# Patient Record
Sex: MALE | Race: WHITE | NOT HISPANIC OR LATINO | Employment: FULL TIME | URBAN - METROPOLITAN AREA
[De-identification: names, ages, dates, MRNs, and addresses within clinical notes are randomized per-mention and may not be internally consistent; named-entity substitution may affect disease eponyms.]

---

## 2018-07-17 ENCOUNTER — NURSE TRIAGE (OUTPATIENT)
Dept: ADMINISTRATIVE | Facility: CLINIC | Age: 23
End: 2018-07-17

## 2018-07-18 NOTE — TELEPHONE ENCOUNTER
Reason for Disposition   [1] SEVERE widespread itching (i.e., interferes with sleep, normal activities or school) AND [2] not improved after 24 hours of itching Care Advice   [1] New-onset head lice AND [2] usually respond to OTC meds in your community  (all triage questions negative)    Protocols used: ST ITCHING - WIDESPREAD AND CAUSE UNKNOWN-A-AH, ST LICE - HEAD-A-AH

## 2018-07-26 ENCOUNTER — NURSE TRIAGE (OUTPATIENT)
Dept: ADMINISTRATIVE | Facility: CLINIC | Age: 23
End: 2018-07-26

## 2018-07-27 NOTE — TELEPHONE ENCOUNTER
Reason for Disposition   Scabies treatment, questions about    Protocols used:  SCABIES FOLLOW-UP CALL-A-PELON    Kurt called wanting to know can scabies be in bed linens.  Home care advice given; explained can live on clothing that has been next to skin for 3 days. Information on washing of clothing and linens given below.  Encouraged call back for any additional questions.  Message to Ajay Conley Jr., MD , pcp. Please contact caller directly with any additional care advice.        Scabies  Scabies is an infection caused by mites that burrow into the skin. The mites, called Sarcoptes scabiei, are very tiny. They cause severe itching. Though children are most commonly infected, anyone can get scabies. Scabies mites can pass from person to person through close physical contact. They can also be passed through shared clothing, towels, and bedding. Scabies infection is not usually dangerous, but it is uncomfortable. Because it is so contagious, scabies should be treated immediately to keep the infection from spreading.  Symptoms  Symptoms of scabies appear about 4 weeks to 6 weeks after infection in a child or adult who has never had scabies before. A child or adult who has been infected before will experience symptoms much sooner, in 1 day to 4 days. Signs of scabies infection may include:  Intense itching, especially at night or after a hot bath.  Skin irritations that look like hives, insect bites, pimples, or blisters, especially on warmer areas of the body (such as between the fingers, in the armpits, and in the creases of the wrists, elbows, and knees).  Sores on the body caused by scratching (the sores may become infected).  [place] created by mites traveling under the skin, which look like lines on the skins surface.  Treating Scabies Infection  Scabies infections are usually treated with a prescription lotion that kills the mites. The lotion must be applied to the entire body from the neck down  (including the palms of the hands, soles of the feet, groin, and under the fingernails). The lotion must be left on for 8 hours to 14 hours. In some cases, a second application of lotion is needed a week after the first. Medications work quickly, but most children and adults continue to have an itchy rash for several weeks after treatment. Marks on the skin from scabies usually go away in a week or 2, but sometimes take a few months to clear.  Preventing Spread of the Infection  To prevent reinfection and the spread of scabies to others, follow these instructions:  Treatment of all household members who may have been exposed to scabies may be necessary, whether they show symptoms or not. Talk with your doctor.  Wash the infected persons clothing, towels, bed linens, cloth toys, and other personal items in very hot, soapy water. Dry them thoroughly. Do not share among family members.   Seal items that cant be washed in plastic bags for 2 weeks.  Vacuum floors and furniture. Throw the vacuum bag away afterward.  Notify an infected childs school and caregivers so that other children can be checked and treated.  Keep an infected child home from day care or school until the morning after treatment for scabies.  Warn children not to share items such as clothing and towels with other children.  DO NOT spray your house with chemicals or pesticides. These can be dangerous to your familys health.    When to Call the Doctor  Call your doctor if:  The infected person has a fever, red streaks, pain, or swelling of the skin.  Sores get worse or do not heal.  New rashes appear or itching continues for more than 2 weeks after treatment.       © 6777-5598 [employer]. [address] [place] [address], [place] [address]. All rights reserved. This information is not intended as a substitute for professional medical care. Always follow your healthcare professional's instructions.

## 2019-03-26 PROBLEM — L05.91 PILONIDAL CYST: Status: ACTIVE | Noted: 2019-03-26

## 2021-09-09 ENCOUNTER — TELEPHONE (OUTPATIENT)
Dept: OTOLARYNGOLOGY | Facility: CLINIC | Age: 26
End: 2021-09-09

## 2024-02-24 ENCOUNTER — OFFICE VISIT (OUTPATIENT)
Dept: URGENT CARE | Facility: CLINIC | Age: 29
End: 2024-02-24
Payer: COMMERCIAL

## 2024-02-24 VITALS
OXYGEN SATURATION: 96 % | BODY MASS INDEX: 23.62 KG/M2 | SYSTOLIC BLOOD PRESSURE: 152 MMHG | TEMPERATURE: 98 F | RESPIRATION RATE: 18 BRPM | DIASTOLIC BLOOD PRESSURE: 81 MMHG | WEIGHT: 165 LBS | HEART RATE: 58 BPM | HEIGHT: 70 IN

## 2024-02-24 DIAGNOSIS — J02.9 SORE THROAT: Primary | ICD-10-CM

## 2024-02-24 DIAGNOSIS — J06.9 UPPER RESPIRATORY TRACT INFECTION, UNSPECIFIED TYPE: ICD-10-CM

## 2024-02-24 LAB
CTP QC/QA: YES
MOLECULAR STREP A: NEGATIVE
POC MOLECULAR INFLUENZA A AGN: NEGATIVE
POC MOLECULAR INFLUENZA B AGN: NEGATIVE
SARS-COV-2 AG RESP QL IA.RAPID: NEGATIVE

## 2024-02-24 PROCEDURE — 87811 SARS-COV-2 COVID19 W/OPTIC: CPT | Mod: QW,S$GLB,, | Performed by: NURSE PRACTITIONER

## 2024-02-24 PROCEDURE — 87502 INFLUENZA DNA AMP PROBE: CPT | Mod: QW,S$GLB,, | Performed by: NURSE PRACTITIONER

## 2024-02-24 PROCEDURE — 99204 OFFICE O/P NEW MOD 45 MIN: CPT | Mod: S$GLB,,, | Performed by: NURSE PRACTITIONER

## 2024-02-24 PROCEDURE — 87651 STREP A DNA AMP PROBE: CPT | Mod: QW,S$GLB,, | Performed by: NURSE PRACTITIONER

## 2024-02-24 NOTE — LETTER
February 24, 2024      [facility]  [address], [address]  [place] [address]  Phone: [phone]  Fax: [phone]       Patient: Kurt Devlin   YOB: 1995  Date of Visit: 02/24/2024    To Whom It May Concern:    Hazel Devlin  was at [facility] on 02/24/2024. The patient may return to work/school on 2/26/2024 with no restrictions. If you have any questions or concerns, or if I can be of further assistance, please do not hesitate to contact me.    Sincerely,    Yael Spears, NP

## 2024-02-24 NOTE — PROGRESS NOTES
"Subjective:      Patient ID: Kurt Devlin is a 28 y.o. male.    Vitals:  height is 5' 10" (1.778 m) and weight is 74.8 kg (165 lb). His oral temperature is 98.2 °F (36.8 °C). His blood pressure is 152/81 (abnormal) and his pulse is 58 (abnormal). His respiration is 18 and oxygen saturation is 96%.     Chief Complaint: Sore Throat    Pt started last night with a sore throat and now has nausea. Pt had a negative home COVID test today.    Sore Throat   This is a new problem. The current episode started yesterday. The problem has been gradually worsening. Neither side of throat is experiencing more pain than the other. There has been no fever. The fever has been present for Less than 1 day. The pain is at a severity of 6/10. The pain is moderate. Associated symptoms include congestion, coughing and headaches. Pertinent negatives include no ear pain. Associated symptoms comments: Body aches, nausea. He has tried acetaminophen (Mucinex) for the symptoms. The treatment provided moderate relief.       HENT:  Positive for congestion and sore throat. Negative for ear pain.    Respiratory:  Positive for cough.    Neurological:  Positive for headaches.      Objective:     Physical Exam   Constitutional: He is oriented to person, place, and time. normal  HENT:   Nose: Congestion present.   Mouth/Throat: Posterior oropharyngeal erythema present.   Eyes: Conjunctivae are normal. Pupils are equal, round, and reactive to light. Extraocular movement intact   Cardiovascular: Normal rate.   Pulmonary/Chest: Effort normal.   Abdominal: Normal appearance.   Musculoskeletal: Normal range of motion.         General: Normal range of motion.   Neurological: no focal deficit. He is alert, oriented to person, place, and time and at baseline.   Skin: Skin is warm. Capillary refill takes less than 2 seconds.   Nursing note and vitals reviewed.    Assessment:     1. Sore throat    2. Upper respiratory tract infection, unspecified type  "     Results for orders placed or performed in visit on 02/24/24   POCT Strep A, Molecular   Result Value Ref Range    Molecular Strep A, POC Negative Negative     Acceptable Yes    POCT Influenza A/B MOLECULAR   Result Value Ref Range    POC Molecular Influenza A Ag Negative Negative, Not Reported    POC Molecular Influenza B Ag Negative Negative, Not Reported     Acceptable Yes    SARS Coronavirus 2 Antigen, POCT Manual Read   Result Value Ref Range    SARS Coronavirus 2 Antigen Negative Negative     Acceptable Yes        Plan:       Sore throat  -     POCT Strep A, Molecular  -     POCT Influenza A/B MOLECULAR  -     SARS Coronavirus 2 Antigen, POCT Manual Read    Upper respiratory tract infection, unspecified type

## 2024-04-20 ENCOUNTER — OFFICE VISIT (OUTPATIENT)
Dept: URGENT CARE | Facility: CLINIC | Age: 29
End: 2024-04-20
Payer: COMMERCIAL

## 2024-04-20 VITALS
WEIGHT: 160 LBS | BODY MASS INDEX: 22.9 KG/M2 | SYSTOLIC BLOOD PRESSURE: 135 MMHG | HEIGHT: 70 IN | RESPIRATION RATE: 17 BRPM | TEMPERATURE: 98 F | HEART RATE: 71 BPM | DIASTOLIC BLOOD PRESSURE: 89 MMHG | OXYGEN SATURATION: 98 %

## 2024-04-20 DIAGNOSIS — L73.9 FOLLICULITIS: Primary | ICD-10-CM

## 2024-04-20 PROCEDURE — 99213 OFFICE O/P EST LOW 20 MIN: CPT | Mod: S$GLB,,, | Performed by: PHYSICIAN ASSISTANT

## 2024-04-20 RX ORDER — MUPIROCIN 20 MG/G
OINTMENT TOPICAL 3 TIMES DAILY
Qty: 15 G | Refills: 0 | Status: SHIPPED | OUTPATIENT
Start: 2024-04-20

## 2024-04-20 RX ORDER — MUPIROCIN 20 MG/G
OINTMENT TOPICAL 3 TIMES DAILY
Qty: 15 G | Refills: 0 | Status: SHIPPED | OUTPATIENT
Start: 2024-04-20 | End: 2024-04-20

## 2024-04-20 NOTE — PROGRESS NOTES
"Subjective:      Patient ID: Kurt Devlin is a 28 y.o. male.    Vitals:  height is 5' 10" (1.778 m) and weight is 72.6 kg (160 lb). His oral temperature is 98.1 °F (36.7 °C). His blood pressure is 135/89 and his pulse is 71. His respiration is 17 and oxygen saturation is 98%.     Chief Complaint: Rash    Pt reports x3 days he has had a rash on his penis. Patient reports getting very sweaty while crabbing and noticed some irritation. He has been very sexually active and believes friction made it worse. No itching or significant pain to area, other than touching.      Rash  This is a new problem. The current episode started in the past 7 days. The problem has been gradually improving since onset. The affected locations include the abdomen (genital). The rash is characterized by redness, itchiness and swelling. He was exposed to nothing. Pertinent negatives include no congestion, cough, fatigue, fever, rhinorrhea or shortness of breath. Treatments tried: lotrimin cream. The treatment provided mild relief.       Constitution: Negative for fatigue and fever.   HENT:  Negative for congestion.    Respiratory:  Negative for cough and shortness of breath.    Skin:  Positive for rash.      Objective:     Physical Exam   Constitutional: He is oriented to person, place, and time. He appears well-developed. No distress.   HENT:   Head: Normocephalic and atraumatic.   Ears:   Right Ear: External ear normal.   Left Ear: External ear normal.   Nose: Nose normal. No nasal deformity. No epistaxis.   Mouth/Throat: Oropharynx is clear and moist and mucous membranes are normal.   Eyes: Conjunctivae and lids are normal.   Neck: Trachea normal and phonation normal. Neck supple.   Cardiovascular: Normal rate, regular rhythm and normal heart sounds.   Pulmonary/Chest: Effort normal and breath sounds normal.   Abdominal: Normal appearance and bowel sounds are normal. He exhibits no distension. Soft. There is no abdominal tenderness. "   Genitourinary:    Testes normal.           Comments: Minuscule scab to underside of penis with mild crusting. No vessicles or lesions.       Musculoskeletal: Normal range of motion.         General: Normal range of motion.   Neurological: He is alert and oriented to person, place, and time. He has normal reflexes.   Skin: Skin is warm, dry, intact and not diaphoretic.   Psychiatric: His speech is normal and behavior is normal. Judgment and thought content normal.   Nursing note and vitals reviewed.      Assessment:     1. Folliculitis        Plan:       Folliculitis  -     Discontinue: mupirocin (BACTROBAN) 2 % ointment; Apply topically 3 (three) times daily.  Dispense: 15 g; Refill: 0  -     mupirocin (BACTROBAN) 2 % ointment; Apply topically 3 (three) times daily.  Dispense: 15 g; Refill: 0

## 2024-04-29 ENCOUNTER — OFFICE VISIT (OUTPATIENT)
Dept: URGENT CARE | Facility: CLINIC | Age: 29
End: 2024-04-29
Payer: COMMERCIAL

## 2024-04-29 VITALS
BODY MASS INDEX: 23.62 KG/M2 | WEIGHT: 165 LBS | RESPIRATION RATE: 17 BRPM | HEART RATE: 56 BPM | DIASTOLIC BLOOD PRESSURE: 83 MMHG | OXYGEN SATURATION: 98 % | SYSTOLIC BLOOD PRESSURE: 125 MMHG | HEIGHT: 70 IN | TEMPERATURE: 98 F

## 2024-04-29 DIAGNOSIS — Z20.2 STD EXPOSURE: Primary | ICD-10-CM

## 2024-04-29 PROCEDURE — 99214 OFFICE O/P EST MOD 30 MIN: CPT | Mod: S$GLB,,, | Performed by: FAMILY MEDICINE

## 2024-04-29 NOTE — LETTER
April 29, 2024  Kurt Devlin  [address]  [facility] [address]                [facility]  [address], [address]  [facility] [address]  Phone: [phone]  Fax: [phone] Kurt Devlin was seen and treated in our Urgent Care department on 4/29/2024. He may return to work in 2 - 3 days.      If you have any questions or concerns, please don't hesitate to call.        Sincerely,        Alverto Wiley MD

## 2024-04-29 NOTE — PROGRESS NOTES
"Subjective:      Patient ID: Kurt Devlin is a 28 y.o. male.    Vitals:  height is 5' 10" (1.778 m) and weight is 74.8 kg (165 lb). His oral temperature is 98.2 °F (36.8 °C). His blood pressure is 125/83 and his pulse is 56 (abnormal). His respiration is 17 and oxygen saturation is 98%.     Chief Complaint: Exposure to STD    Pt states that his partner told him that she went to the doctor and her skin was broke out. She was told it was herpes. Pt states that he was here two weeks ago with the same thing but was told it was a hair follicle. Its went away.     Exposure to STD  The patient's pertinent negatives include no genital itching, pelvic pain or testicular pain. This is a new problem. Associated symptoms include a rash. Pertinent negatives include no discolored urine, flank pain, frequency or hesitancy. Nothing aggravates the symptoms. He has tried nothing for the symptoms. The treatment provided no relief. He is sexually active. He inconsistently uses condoms. It is possible that his partner has an STD. There is no history of erectile dysfunction, gonorrhea, herpes simplex or kidney stones.       Constitution: Negative.   HENT: Negative.     Cardiovascular: Negative.    Eyes: Negative.    Respiratory: Negative.     Gastrointestinal: Negative.    Endocrine: negative.   Genitourinary: Negative.  Negative for frequency, flank pain, testicular pain and pelvic pain.   Musculoskeletal: Negative.    Skin:  Positive for rash.   Allergic/Immunologic: Negative.    Neurological: Negative.    Hematologic/Lymphatic: Negative.    Psychiatric/Behavioral: Negative.        Objective:     Physical Exam   Constitutional: He is oriented to person, place, and time. He appears well-developed. No distress.   HENT:   Head: Normocephalic and atraumatic.   Ears:   Right Ear: External ear normal.   Left Ear: External ear normal.   Nose: Nose normal. No nasal deformity. No epistaxis.   Mouth/Throat: Oropharynx is clear and moist " and mucous membranes are normal.   Eyes: Conjunctivae and lids are normal.   Neck: Trachea normal and phonation normal. Neck supple.   Cardiovascular: Normal rate, regular rhythm and normal heart sounds.   Pulmonary/Chest: Effort normal and breath sounds normal.   Abdominal: Normal appearance and bowel sounds are normal. He exhibits no distension. Soft. There is no abdominal tenderness.   Musculoskeletal: Normal range of motion.         General: Normal range of motion.   Neurological: He is alert and oriented to person, place, and time. He has normal reflexes.   Skin: Skin is warm, dry, intact and not diaphoretic.   Psychiatric: His speech is normal and behavior is normal. Judgment and thought content normal.   Nursing note and vitals reviewed.      Assessment:     1. STD exposure        Plan:       STD exposure      Please return here or go to the Emergency Department for any concerns or worsening of condition.  If you were prescribed antibiotics, please take them to completion.  If you were prescribed a narcotic medication, do not drive or operate heavy equipment or machinery while taking these medications.  Please follow up with your primary care doctor or specialist as needed.  Please drink plenty of fluids.  Please get plenty of rest.    Push fluids aggressively to improve symptoms and wash through the infection.  Cranberry juice can help relieve symptoms    We recommend avoiding sexual activity until your culture results come back.  To avoid reinfection, your sexual partner will need to be treated as well prior to resuming sexual activity.    We recommend always using barrier protection (condoms) during sexual activity.    If you  smoke, please stop smoking.    Please follow up with your primary care doctor or specialist as needed.    Ajay Conley Jr., MD  [phone]    You must understand that you have received treatment at an Urgent Care facility only, and that you may be  released before all of your  medical problems are known or treated. Urgent Care facilities are not equipped to  handle life threatening emergencies. It is recommended that you seek care at an Emergency Department for  further evaluation of worsening or concerning symptoms, or possibly life threatening conditions as  discussed.

## 2024-04-29 NOTE — PATIENT INSTRUCTIONS
Please return here or go to the Emergency Department for any concerns or worsening of condition.  If you were prescribed antibiotics, please take them to completion.  If you were prescribed a narcotic medication, do not drive or operate heavy equipment or machinery while taking these medications.  Please follow up with your primary care doctor or specialist as needed.  Please drink plenty of fluids.  Please get plenty of rest.    Push fluids aggressively to improve symptoms and wash through the infection.  Cranberry juice can help relieve symptoms    We recommend avoiding sexual activity until your culture results come back.  To avoid reinfection, your sexual partner will need to be treated as well prior to resuming sexual activity.    We recommend always using barrier protection (condoms) during sexual activity.    If you  smoke, please stop smoking.    Please follow up with your primary care doctor or specialist as needed.    Ajay Conley Jr., MD  [phone]      You must understand that you have received treatment at an Urgent Care facility only, and that you may be  released before all of your medical problems are known or treated. Urgent Care facilities are not equipped to  handle life threatening emergencies. It is recommended that you seek care at an Emergency Department for  further evaluation of worsening or concerning symptoms, or possibly life threatening conditions as  discussed.    Suspected STI, Culture Only  Your symptoms suggest that you may have a sexually transmitted infection (STI). The most common bacteria that cause STIs are chlamydia and gonorrhea. Both are highly contagious. They are passed by sexual contact with an infected partner.  Symptoms begin within 1 to 3 weeks after exposure. There is usually a discharge from the penis or vagina and burning during urination. Many women with one of these infections will have only mild symptoms or no symptoms at all early in the disease.  Culture  tests have been taken. These will show if you have an infection with chlamydia or gonorrhea. These infections can be treated and cured with antibiotic medicine.  Home care  Do not have sex until you know that your test result is negative.  If a culture was done, call for the results. If the culture is positive, contact your healthcare provider, local clinic, or local public health department to be treated, or return to our facility.  You will be prescribed antibiotic medicine. Be sure to take all of the antibiotic as prescribed until it is gone or you are told to stop. Keep taking it even if you feel better.  Both you and your sexual partner or partners need to be treated, even if the partner has no symptoms.  Do not have sex until both you and your partner or partners have finished all antibiotic medicine and you are told that you are no longer contagious.  Learn about safe sex practices and use these in the future. The safest sex is with a partner who has tested negative for STIs and only has sex with you. Condoms can help prevent the spread of gonorrhea and chlamydia, but are not a guarantee.  Follow-up care  Follow up with your healthcare provider or as advised by our staff. Call as directed for the results of your culture. If your culture test is positive and you are treated with an antibiotic, you should have another culture taken 4 to 6 weeks after treatment. This is to be sure the infection has cleared. Follow up with your provider or the public health department for complete STI screening, including HIV testing. For more information about STIs, call the CDC information line at 348-136-3636.  When to seek medical advice  Call your healthcare provider if any of these occur:  Fever of 100.4ºF (38.0ºC) or higher, or as directed  New pain in your lower belly (abdomen) or back or pain that gets worse  Unexpected vaginal bleeding  Weakness, dizziness, or fainting  Repeated vomiting  Inability to urinate because  of pain  Rash or joint pain  Painful open sores on the penis or around the outer vagina  Enlarged painful lumps (lymph nodes) in the groin  Testicle pain or scrotal swelling in men  Date Last Reviewed: 9/25/2015 © 2000-2017 [employer]. [address] [place] [address]. All rights reserved. This information is not intended as a substitute for professional medical care. Always follow your healthcare professional's instructions.

## 2024-07-16 ENCOUNTER — OFFICE VISIT (OUTPATIENT)
Dept: URGENT CARE | Facility: CLINIC | Age: 29
End: 2024-07-16
Payer: COMMERCIAL

## 2024-07-16 VITALS
OXYGEN SATURATION: 97 % | RESPIRATION RATE: 20 BRPM | DIASTOLIC BLOOD PRESSURE: 80 MMHG | HEART RATE: 74 BPM | SYSTOLIC BLOOD PRESSURE: 134 MMHG | BODY MASS INDEX: 24.01 KG/M2 | WEIGHT: 167.69 LBS | HEIGHT: 70 IN | TEMPERATURE: 98 F

## 2024-07-16 DIAGNOSIS — R11.0 NAUSEA: Primary | ICD-10-CM

## 2024-07-16 PROCEDURE — 99213 OFFICE O/P EST LOW 20 MIN: CPT | Mod: S$GLB,,, | Performed by: PHYSICIAN ASSISTANT

## 2024-07-16 RX ORDER — ONDANSETRON 4 MG/1
4 TABLET, ORALLY DISINTEGRATING ORAL EVERY 6 HOURS PRN
Qty: 20 TABLET | Refills: 0 | Status: SHIPPED | OUTPATIENT
Start: 2024-07-16

## 2024-07-16 NOTE — PROGRESS NOTES
"Subjective:      Patient ID: Kurt Devlin is a 28 y.o. male.    Vitals:  height is 5' 10" (1.778 m) and weight is 76.1 kg (167 lb 10.6 oz). His oral temperature is 97.9 °F (36.6 °C). His blood pressure is 134/80 and his pulse is 74. His respiration is 20 and oxygen saturation is 97%.     Chief Complaint: Nausea    Pt states he is coming in for nausea and decreased appetite since this morning. Reports no vomiting or diarrhea. Admits that his nausea is improving but was told he needed a note to return to work    Nausea  This is a new problem. The current episode started yesterday. The problem occurs rarely. The problem has been unchanged. Associated symptoms include chills and nausea. Pertinent negatives include no congestion, coughing, headaches, sore throat or vomiting. The symptoms are aggravated by eating. He has tried nothing for the symptoms.       Constitution: Positive for chills.   HENT:  Negative for congestion and sore throat.    Respiratory:  Negative for cough.    Gastrointestinal:  Positive for nausea. Negative for vomiting.   Neurological:  Negative for headaches.      Objective:     Physical Exam   Constitutional: He is oriented to person, place, and time. He appears well-developed. He is cooperative.  Non-toxic appearance. He does not appear ill. No distress.   HENT:   Head: Normocephalic and atraumatic.   Ears:   Right Ear: Hearing and external ear normal.   Left Ear: Hearing and external ear normal.   Nose: Nose normal.   Mouth/Throat: Mucous membranes are moist. Oropharynx is clear.   Eyes: Conjunctivae and lids are normal. No scleral icterus.   Neck: Trachea normal and phonation normal. Neck supple. No edema present. No erythema present. No neck rigidity present.   Cardiovascular: Normal rate, regular rhythm, normal heart sounds and normal pulses.   No murmur heard.Exam reveals no gallop and no friction rub.   Pulmonary/Chest: Effort normal and breath sounds normal. No stridor. No " respiratory distress. He has no wheezes. He has no rhonchi. He has no rales.   Abdominal: Normal appearance and bowel sounds are normal. He exhibits no distension and no mass. Soft. There is no abdominal tenderness. There is no rebound, no guarding, no left CVA tenderness and no right CVA tenderness. No hernia.   Neurological: He is alert and oriented to person, place, and time. Coordination normal.   Skin: Skin is dry, intact, not diaphoretic and not pale.   Psychiatric: His speech is normal and behavior is normal. Judgment and thought content normal.   Nursing note and vitals reviewed.      Assessment:     1. Nausea        Plan:       Nausea  -     ondansetron (ZOFRAN-ODT) 4 MG TbDL; Take 1 tablet (4 mg total) by mouth every 6 (six) hours as needed (nausea/vomiting/diarrhea).  Dispense: 20 tablet; Refill: 0      Alternate ibuprofen and tylenol as needed for fever/pain/body aches every 4-6 hours. Rest, increase PO fluids.   Discussed with patient the importance of f/u with their primary care provider. Urged to go to the ER for any worsening signs or symptoms.

## 2024-07-16 NOTE — LETTER
July 16, 2024      [facility]  [address]  [place] [address]  Phone: [phone]  Fax: [phone]       Patient: Kurt Devlin   YOB: 1995  Date of Visit: 07/16/2024    To Whom It May Concern:    Hazel Devlin  was at [facility] on 07/16/2024. The patient may return to work/school on 07/17/2024 with no restrictions. If you have any questions or concerns, or if I can be of further assistance, please do not hesitate to contact me.    Sincerely,    Mara Kaplan PA-C

## 2024-07-31 ENCOUNTER — OFFICE VISIT (OUTPATIENT)
Dept: URGENT CARE | Facility: CLINIC | Age: 29
End: 2024-07-31
Payer: COMMERCIAL

## 2024-07-31 VITALS
OXYGEN SATURATION: 99 % | HEIGHT: 70 IN | HEART RATE: 61 BPM | TEMPERATURE: 98 F | SYSTOLIC BLOOD PRESSURE: 131 MMHG | DIASTOLIC BLOOD PRESSURE: 90 MMHG | RESPIRATION RATE: 18 BRPM | BODY MASS INDEX: 25.03 KG/M2 | WEIGHT: 174.81 LBS

## 2024-07-31 DIAGNOSIS — L02.422 FURUNCLE OF LEFT AXILLA: Primary | ICD-10-CM

## 2024-07-31 PROCEDURE — 99213 OFFICE O/P EST LOW 20 MIN: CPT | Mod: S$GLB,,,

## 2024-07-31 RX ORDER — MUPIROCIN 20 MG/G
OINTMENT TOPICAL 3 TIMES DAILY
Qty: 15 G | Refills: 0 | Status: SHIPPED | OUTPATIENT
Start: 2024-07-31

## 2024-07-31 NOTE — PROGRESS NOTES
"Subjective:      Patient ID: Kurt Devlin is a 28 y.o. male.    Vitals:  height is 5' 10" (1.778 m) and weight is 79.3 kg (174 lb 13.2 oz). His oral temperature is 98.1 °F (36.7 °C). His blood pressure is 131/90 (abnormal) and his pulse is 61. His respiration is 18 and oxygen saturation is 99%.     Chief Complaint: Possible Cysts    Pt is coming in for a possible cyst in left armpit. Pt noticed it yesterday.    Other  This is a new problem. The current episode started yesterday. The problem occurs constantly. The problem has been gradually worsening. Pertinent negatives include no chills, congestion, fever, nausea or vomiting. Nothing aggravates the symptoms. He has tried nothing for the symptoms. The treatment provided no relief.       Constitution: Negative for chills and fever.   HENT:  Negative for congestion.    Gastrointestinal:  Negative for nausea and vomiting.   Skin:  Positive for skin thickening/induration. Negative for erythema.      Objective:     Physical Exam   Constitutional: He is oriented to person, place, and time. He appears well-developed.  Non-toxic appearance. He does not appear ill. No distress. normal  HENT:   Head: Normocephalic and atraumatic. Head is without abrasion, without contusion and without laceration.   Ears:   Right Ear: External ear normal.   Left Ear: External ear normal.   Nose: Nose normal.   Mouth/Throat: Oropharynx is clear and moist and mucous membranes are normal.   Eyes: Conjunctivae, EOM and lids are normal. Pupils are equal, round, and reactive to light.   Neck: Trachea normal and phonation normal. Neck supple.   Cardiovascular: Normal rate, regular rhythm and normal heart sounds.   Pulmonary/Chest: Effort normal and breath sounds normal. No stridor. No respiratory distress.   Abdominal: Normal appearance.   Musculoskeletal: Normal range of motion.         General: Normal range of motion.   Neurological: He is alert and oriented to person, place, and time. "   Skin: Skin is warm, dry, intact, not diaphoretic and no rash. Capillary refill takes less than 2 seconds. lesion (L axilla - about 1 cm, firm, tender to palpation, erythematous, skin intact, no fluctuance) No abrasion, No burn, No bruising, No erythema and No ecchymosis   Psychiatric: His speech is normal and behavior is normal. Judgment and thought content normal.   Nursing note and vitals reviewed.      Assessment:     1. Furuncle of left axilla        Plan:       Furuncle of left axilla  -     mupirocin (BACTROBAN) 2 % ointment; Apply topically 3 (three) times daily.  Dispense: 15 g; Refill: 0      Patient Instructions   1.  Apply warm compresses for 15 minutes every few hours to help the boil drain.  See attached handout for more information about your condition and how to treat it.  Apply topical antibiotic to wound 3x daily if it opens up and begins to drain.  Return for further evaluation if you notice worsening redness of the surrounding skin.   2.  Rest and keep yourself/patient well hydrated.  3.  You can alternate Tylenol and Motrin every 4-6 hours for fever above 100.4F and/or pain.   4. You should schedule a follow-up appointment with your Primary Care Provider for recheck in 2-3 days or as directed at this visit.   5.  If your condition fails to improve in a timely manner, you should receive another evaluation by your Primary Care Provider to discuss your concerns or return to urgent care for a recheck.  If your condition worsens at any time, you should report immediately to your nearest Emergency Department for further evaluation. **You must understand that you have received Urgent Care treatment only and that you may be released before all of your medical problems are known or treated. You, the patient, are responsible to arrange for follow-up care as instructed.                      Detail Level: Zone

## 2024-07-31 NOTE — PATIENT INSTRUCTIONS
1.  Apply warm compresses for 15 minutes every few hours to help the boil drain.  See attached handout for more information about your condition and how to treat it.  Apply topical antibiotic to wound 3x daily if it opens up and begins to drain.  Return for further evaluation if you notice worsening redness of the surrounding skin.   2.  Rest and keep yourself/patient well hydrated.  3.  You can alternate Tylenol and Motrin every 4-6 hours for fever above 100.4F and/or pain.   4. You should schedule a follow-up appointment with your Primary Care Provider for recheck in 2-3 days or as directed at this visit.   5.  If your condition fails to improve in a timely manner, you should receive another evaluation by your Primary Care Provider to discuss your concerns or return to urgent care for a recheck.  If your condition worsens at any time, you should report immediately to your nearest Emergency Department for further evaluation. **You must understand that you have received Urgent Care treatment only and that you may be released before all of your medical problems are known or treated. You, the patient, are responsible to arrange for follow-up care as instructed.

## 2024-08-12 ENCOUNTER — OFFICE VISIT (OUTPATIENT)
Dept: URGENT CARE | Facility: CLINIC | Age: 29
End: 2024-08-12
Payer: COMMERCIAL

## 2024-08-12 VITALS
BODY MASS INDEX: 24.49 KG/M2 | TEMPERATURE: 98 F | WEIGHT: 171.06 LBS | SYSTOLIC BLOOD PRESSURE: 122 MMHG | OXYGEN SATURATION: 98 % | HEART RATE: 54 BPM | DIASTOLIC BLOOD PRESSURE: 84 MMHG | RESPIRATION RATE: 18 BRPM | HEIGHT: 70 IN

## 2024-08-12 DIAGNOSIS — B34.9 VIRAL SYNDROME: ICD-10-CM

## 2024-08-12 DIAGNOSIS — R52 BODY ACHES: Primary | ICD-10-CM

## 2024-08-12 PROCEDURE — 99214 OFFICE O/P EST MOD 30 MIN: CPT | Mod: S$GLB,,, | Performed by: NURSE PRACTITIONER

## 2024-08-12 NOTE — LETTER
August 12, 2024      [facility]  [address], [address]  [place] [address]  Phone: [phone]  Fax: [phone]       Patient: Kurt Devlin   YOB: 1995  Date of Visit: 08/12/2024    To Whom It May Concern:    Hazel Devlin  was at [facility] on 08/12/2024. The patient may return to work/school on 8/13/2024 with no restrictions. If you have any questions or concerns, or if I can be of further assistance, please do not hesitate to contact me.    Sincerely,    Yael Spears NP

## 2024-08-12 NOTE — PROGRESS NOTES
"Subjective:      Patient ID: Kurt Devlin is a 28 y.o. male.    Vitals:  height is 5' 10" (1.778 m) and weight is 77.6 kg (171 lb 1.2 oz). His oral temperature is 98.3 °F (36.8 °C). His blood pressure is 122/84 and his pulse is 54 (abnormal). His respiration is 18 and oxygen saturation is 98%.     Chief Complaint: Generalized Body Aches    Other  This is a new problem. The current episode started yesterday. The problem has been resolved. Pertinent negatives include no congestion, coughing, fever, headaches, nausea or vomiting. Associated symptoms comments: Negative covid test at home. He has tried nothing for the symptoms.       Constitution: Negative for fever.   HENT:  Negative for congestion.    Respiratory:  Negative for cough.    Gastrointestinal:  Negative for nausea and vomiting.   Neurological:  Negative for headaches.      Objective:     Physical Exam   Constitutional: He is oriented to person, place, and time. normal  HENT:   Head: Normocephalic.   Ears:   Right Ear: Tympanic membrane normal.   Left Ear: Tympanic membrane normal.   Nose: Nose normal.   Eyes: Conjunctivae are normal. Pupils are equal, round, and reactive to light. Extraocular movement intact   Cardiovascular: Normal rate.   Abdominal: Normal appearance and bowel sounds are normal.   Musculoskeletal: Normal range of motion.         General: Normal range of motion.   Neurological: no focal deficit. He is alert, oriented to person, place, and time and at baseline.   Skin: Skin is warm. Capillary refill takes less than 2 seconds.   Nursing note and vitals reviewed.    Assessment:     1. Body aches    2. Viral syndrome          Plan:       Body aches  -     Cancel: SARS Coronavirus 2 Antigen, POCT Manual Read    Viral syndrome                    "

## 2024-10-24 ENCOUNTER — OFFICE VISIT (OUTPATIENT)
Dept: URGENT CARE | Facility: CLINIC | Age: 29
End: 2024-10-24
Payer: COMMERCIAL

## 2024-10-24 VITALS
HEIGHT: 70 IN | RESPIRATION RATE: 19 BRPM | WEIGHT: 167.56 LBS | DIASTOLIC BLOOD PRESSURE: 90 MMHG | HEART RATE: 55 BPM | TEMPERATURE: 98 F | BODY MASS INDEX: 23.99 KG/M2 | OXYGEN SATURATION: 98 % | SYSTOLIC BLOOD PRESSURE: 130 MMHG

## 2024-10-24 DIAGNOSIS — L08.9 FINGER INFECTION: Primary | ICD-10-CM

## 2024-10-24 PROCEDURE — 99213 OFFICE O/P EST LOW 20 MIN: CPT | Mod: S$GLB,,,

## 2024-10-24 RX ORDER — IBUPROFEN 800 MG/1
800 TABLET ORAL 3 TIMES DAILY
Qty: 20 TABLET | Refills: 0 | Status: SHIPPED | OUTPATIENT
Start: 2024-10-24

## 2024-10-24 RX ORDER — MUPIROCIN 20 MG/G
OINTMENT TOPICAL 3 TIMES DAILY
Qty: 15 G | Refills: 0 | Status: SHIPPED | OUTPATIENT
Start: 2024-10-24

## 2024-10-24 RX ORDER — DOXYCYCLINE 100 MG/1
100 CAPSULE ORAL 2 TIMES DAILY
Qty: 14 CAPSULE | Refills: 0 | Status: SHIPPED | OUTPATIENT
Start: 2024-10-24 | End: 2024-10-31

## 2024-10-24 NOTE — PATIENT INSTRUCTIONS
1.  Complete the course of antibiotics.  See attached handout for more information regarding your condition and how to manage it.   2.  Rest and keep yourself/patient well hydrated.  3.  You can alternate Tylenol and Motrin every 4-6 hours for fever above 100.4F and/or pain.   4. You should schedule a follow-up appointment with your Primary Care Provider for recheck in 2-3 days or as directed at this visit.   5.  If your condition fails to improve in a timely manner, you should receive another evaluation by your Primary Care Provider to discuss your concerns or return to urgent care for a recheck.  If your condition worsens at any time, you should report immediately to your nearest Emergency Department for further evaluation. **You must understand that you have received Urgent Care treatment only and that you may be released before all of your medical problems are known or treated. You, the patient, are responsible to arrange for follow-up care as instructed.

## 2024-10-24 NOTE — PROGRESS NOTES
"Subjective:      Patient ID: Kurt Devlin is a 29 y.o. male.    Vitals:  height is 5' 10" (1.778 m) and weight is 76 kg (167 lb 8.8 oz). His oral temperature is 98.2 °F (36.8 °C). His blood pressure is 130/90 (abnormal) and his pulse is 55 (abnormal). His respiration is 19 and oxygen saturation is 98%.     Chief Complaint: Hand Pain    Patient was throwing a cast net and the shrimp stuck him in his middle fingers. Patient states he has swelling and purulent drainage in both of those fingers. Pain and swelling started yesterday morning.  Stuck a sharp filet knife into both wounds this morning and was able to get some drainage out.     Hand Pain   His dominant hand is their right hand. Incident onset: 2 days ago. Incident location: cast netting. The pain is present in the left fingers and right fingers (both middle fingers). The quality of the pain is described as aching. The pain is at a severity of 8/10. The pain is moderate. The pain has been Constant since the incident. Exacerbated by: toughing. Treatments tried: antiboitic cream. The treatment provided no relief.       Constitution: Negative for fever.   Skin:  Positive for abscess. Negative for erythema.      Objective:     Physical Exam   Constitutional: He is oriented to person, place, and time. He appears well-developed.   HENT:   Head: Normocephalic and atraumatic. Head is without abrasion, without contusion and without laceration.   Ears:   Right Ear: External ear normal.   Left Ear: External ear normal.   Nose: Nose normal.   Mouth/Throat: Oropharynx is clear and moist and mucous membranes are normal.   Eyes: Conjunctivae, EOM and lids are normal.   Neck: Trachea normal and phonation normal. Neck supple.   Cardiovascular: Normal rate.   Pulmonary/Chest: Effort normal. No stridor. No respiratory distress.   Musculoskeletal: Normal range of motion.         General: Normal range of motion.   Neurological: He is alert and oriented to person, place, and " time.   Skin: Skin is warm, dry, intact and no rash. Capillary refill takes less than 2 seconds. lesion (Distal L middle finger with draining abscess to lateral palmar surface and area of erythema that does not extend yet to the joint.  R middle finger with area of erythema. No drainage.) No abrasion, No burn, No bruising, No erythema and No ecchymosis   Psychiatric: His speech is normal and behavior is normal. Judgment and thought content normal.   Nursing note and vitals reviewed.      Assessment:     1. Finger infection        Plan:       Finger infection  -     doxycycline (VIBRAMYCIN) 100 MG Cap; Take 1 capsule (100 mg total) by mouth 2 (two) times daily. for 7 days  Dispense: 14 capsule; Refill: 0  -     mupirocin (BACTROBAN) 2 % ointment; Apply topically 3 (three) times daily.  Dispense: 15 g; Refill: 0      Patient Instructions   1.  Complete the course of antibiotics.  See attached handout for more information regarding your condition and how to manage it.   2.  Rest and keep yourself/patient well hydrated.  3.  You can alternate Tylenol and Motrin every 4-6 hours for fever above 100.4F and/or pain.   4. You should schedule a follow-up appointment with your Primary Care Provider for recheck in 2-3 days or as directed at this visit.   5.  If your condition fails to improve in a timely manner, you should receive another evaluation by your Primary Care Provider to discuss your concerns or return to urgent care for a recheck.  If your condition worsens at any time, you should report immediately to your nearest Emergency Department for further evaluation. **You must understand that you have received Urgent Care treatment only and that you may be released before all of your medical problems are known or treated. You, the patient, are responsible to arrange for follow-up care as instructed.

## 2024-10-30 ENCOUNTER — OCCUPATIONAL HEALTH (OUTPATIENT)
Dept: URGENT CARE | Facility: CLINIC | Age: 29
End: 2024-10-30

## 2024-10-30 DIAGNOSIS — Z02.83 ENCOUNTER FOR DRUG SCREENING: Primary | ICD-10-CM

## 2024-11-05 ENCOUNTER — TELEPHONE (OUTPATIENT)
Dept: ADMINISTRATIVE | Facility: CLINIC | Age: 29
End: 2024-11-05
Payer: COMMERCIAL

## 2024-11-05 NOTE — TELEPHONE ENCOUNTER
Patient was seen in ED on 11/3. He would like to establish care with a PCP as he does not currently have one. Navigator scheduled patient with Dr. Mina Brito on 11/13/24 at 9:00. Patient notified of appointment and verbalized understanding. Appt details emailed to patient at [email or web address].

## 2025-01-11 ENCOUNTER — OFFICE VISIT (OUTPATIENT)
Dept: URGENT CARE | Facility: CLINIC | Age: 30
End: 2025-01-11
Payer: COMMERCIAL

## 2025-01-11 VITALS
OXYGEN SATURATION: 98 % | WEIGHT: 180 LBS | DIASTOLIC BLOOD PRESSURE: 92 MMHG | HEIGHT: 70 IN | TEMPERATURE: 99 F | HEART RATE: 58 BPM | BODY MASS INDEX: 25.77 KG/M2 | SYSTOLIC BLOOD PRESSURE: 143 MMHG | RESPIRATION RATE: 19 BRPM

## 2025-01-11 DIAGNOSIS — A08.4 VIRAL GASTROENTERITIS: ICD-10-CM

## 2025-01-11 DIAGNOSIS — R05.9 COUGH, UNSPECIFIED TYPE: Primary | ICD-10-CM

## 2025-01-11 LAB
CTP QC/QA: YES
CTP QC/QA: YES
POC MOLECULAR INFLUENZA A AGN: NEGATIVE
POC MOLECULAR INFLUENZA B AGN: NEGATIVE
SARS-COV-2 AG RESP QL IA.RAPID: NEGATIVE

## 2025-01-11 PROCEDURE — 99214 OFFICE O/P EST MOD 30 MIN: CPT | Mod: S$GLB,,,

## 2025-01-11 PROCEDURE — 87811 SARS-COV-2 COVID19 W/OPTIC: CPT | Mod: QW,S$GLB,,

## 2025-01-11 PROCEDURE — 87502 INFLUENZA DNA AMP PROBE: CPT | Mod: QW,S$GLB,,

## 2025-01-11 RX ORDER — DEXTROMETHORPHAN HYDROBROMIDE, GUAIFENESIN, PHENYLEPHRINE HYDROCHLORIDE 17.5; 400; 1 MG/1; MG/1; MG/1
1 TABLET ORAL EVERY 4 HOURS PRN
Qty: 30 TABLET | Refills: 0 | Status: SHIPPED | OUTPATIENT
Start: 2025-01-11

## 2025-01-11 RX ORDER — ONDANSETRON 4 MG/1
4 TABLET, ORALLY DISINTEGRATING ORAL EVERY 8 HOURS PRN
Qty: 15 TABLET | Refills: 0 | Status: SHIPPED | OUTPATIENT
Start: 2025-01-11

## 2025-01-11 NOTE — PROGRESS NOTES
"Subjective:      Patient ID: Kurt Devlin is a 29 y.o. male.    Vitals:  height is 5' 10" (1.778 m) and weight is 81.6 kg (180 lb). His oral temperature is 99.1 °F (37.3 °C). His blood pressure is 143/92 (abnormal) and his pulse is 58 (abnormal). His respiration is 19 and oxygen saturation is 98%.     Chief Complaint: Sinus Problem    30 yo M here with 3 day hx of cough, headache when he coughs, chills, nausea, vomiting and diarrhea.  Last episodes of vomiting and diarrhea were last night.  Stool is watery. Denies blood in stool.      Sinus Problem  This is a new problem. The current episode started in the past 7 days. The problem has been gradually worsening since onset. Maximum temperature: not measured at home. His pain is at a severity of 7/10. The pain is moderate. Associated symptoms include chills, coughing and headaches. Pertinent negatives include no congestion, ear pain, shortness of breath, sinus pressure, sneezing or sore throat. (Body aches) Treatments tried: NSAIDs. The treatment provided no relief.       Constitution: Positive for chills.   HENT:  Negative for ear pain, congestion, sinus pressure and sore throat.    Respiratory:  Positive for cough. Negative for shortness of breath and wheezing.    Gastrointestinal:  Positive for nausea, vomiting and diarrhea. Negative for bright red blood in stool.   Allergic/Immunologic: Negative for sneezing.   Neurological:  Positive for headaches.      Objective:     Physical Exam   Constitutional: He is oriented to person, place, and time.  Non-toxic appearance. He does not appear ill. No distress. normal  HENT:   Head: Normocephalic and atraumatic.   Ears:   Right Ear: Tympanic membrane, external ear and ear canal normal.   Left Ear: Tympanic membrane, external ear and ear canal normal.   Nose: Nose normal. No rhinorrhea or congestion.   Mouth/Throat: No oropharyngeal exudate or posterior oropharyngeal erythema.   Eyes: Conjunctivae are normal. "   Cardiovascular: Normal rate, regular rhythm and normal heart sounds.   Pulmonary/Chest: Effort normal and breath sounds normal.   Abdominal: Normal appearance and bowel sounds are normal. He exhibits no distension. Soft. There is no abdominal tenderness. There is no rebound and no guarding.   Neurological: He is alert and oriented to person, place, and time.   Skin: Skin is warm, dry and not diaphoretic. Capillary refill takes less than 2 seconds.   Psychiatric: His behavior is normal.   Nursing note and vitals reviewed.      Assessment:     1. Cough, unspecified type    2. Viral gastroenteritis      Results for orders placed or performed in visit on 01/11/25   POCT Influenza A/B MOLECULAR    Collection Time: 01/11/25  9:15 AM   Result Value Ref Range    POC Molecular Influenza A Ag Negative Negative    POC Molecular Influenza B Ag Negative Negative     Acceptable Yes    SARS Coronavirus 2 Antigen, POCT Manual Read    Collection Time: 01/11/25  9:39 AM   Result Value Ref Range    SARS Coronavirus 2 Antigen Negative Negative     Acceptable Yes        Plan:       Cough, unspecified type  -     POCT Influenza A/B MOLECULAR  -     SARS Coronavirus 2 Antigen, POCT Manual Read    Viral gastroenteritis  -     ondansetron (ZOFRAN-ODT) 4 MG TbDL; Take 1 tablet (4 mg total) by mouth every 8 (eight) hours as needed (nausea).  Dispense: 15 tablet; Refill: 0      Patient Instructions   1.  Take all medications as directed.  2.  Rest and keep yourself/patient well hydrated.  3.  You can alternate Tylenol and Motrin every 4-6 hours for fever above 100.4F and/or pain.   4. You should schedule a follow-up appointment with your Primary Care Provider for recheck in 2-3 days or as directed at this visit.   5.  If your condition fails to improve in a timely manner, you should receive another evaluation by your Primary Care Provider to discuss your concerns or return to urgent care for a recheck.  If your  condition worsens at any time, you should report immediately to your nearest Emergency Department for further evaluation. **You must understand that you have received Urgent Care treatment only and that you may be released before all of your medical problems are known or treated. You, the patient, are responsible to arrange for follow-up care as instructed.

## 2025-01-11 NOTE — PATIENT INSTRUCTIONS
1.  Take all medications as directed.  2.  Rest and keep yourself/patient well hydrated.  3.  You can alternate Tylenol and Motrin every 4-6 hours for fever above 100.4F and/or pain.   4. You should schedule a follow-up appointment with your Primary Care Provider for recheck in 2-3 days or as directed at this visit.   5.  If your condition fails to improve in a timely manner, you should receive another evaluation by your Primary Care Provider to discuss your concerns or return to urgent care for a recheck.  If your condition worsens at any time, you should report immediately to your nearest Emergency Department for further evaluation. **You must understand that you have received Urgent Care treatment only and that you may be released before all of your medical problems are known or treated. You, the patient, are responsible to arrange for follow-up care as instructed.

## 2025-01-11 NOTE — LETTER
January 11, 2025      [facility] [place]  [address]  [place] [address]  Phone: [phone]  Fax: [phone]       Patient: Kurt Devlin   YOB: 1995  Date of Visit: 01/11/2025    To Whom It May Concern:    Hazel Devlin  was at [facility] on 01/11/2025. The patient may return to work/school on 1/13/25 with no restrictions. If you have any questions or concerns, or if I can be of further assistance, please do not hesitate to contact me.    Sincerely,      Nadeen Huff NP

## 2025-01-14 ENCOUNTER — OFFICE VISIT (OUTPATIENT)
Dept: URGENT CARE | Facility: CLINIC | Age: 30
End: 2025-01-14
Payer: COMMERCIAL

## 2025-01-14 VITALS
SYSTOLIC BLOOD PRESSURE: 150 MMHG | TEMPERATURE: 99 F | WEIGHT: 180 LBS | OXYGEN SATURATION: 99 % | HEIGHT: 70 IN | BODY MASS INDEX: 25.77 KG/M2 | RESPIRATION RATE: 18 BRPM | HEART RATE: 69 BPM | DIASTOLIC BLOOD PRESSURE: 89 MMHG

## 2025-01-14 DIAGNOSIS — R50.9 FEVER, UNSPECIFIED FEVER CAUSE: ICD-10-CM

## 2025-01-14 DIAGNOSIS — J01.40 ACUTE NON-RECURRENT PANSINUSITIS: Primary | ICD-10-CM

## 2025-01-14 DIAGNOSIS — R03.0 ELEVATED BLOOD PRESSURE READING: ICD-10-CM

## 2025-01-14 DIAGNOSIS — R51.9 FRONTAL HEADACHE: ICD-10-CM

## 2025-01-14 PROCEDURE — 87811 SARS-COV-2 COVID19 W/OPTIC: CPT | Mod: QW,S$GLB,,

## 2025-01-14 PROCEDURE — 99214 OFFICE O/P EST MOD 30 MIN: CPT | Mod: S$GLB,,,

## 2025-01-14 PROCEDURE — 87502 INFLUENZA DNA AMP PROBE: CPT | Mod: QW,S$GLB,,

## 2025-01-14 RX ORDER — IPRATROPIUM BROMIDE 21 UG/1
2 SPRAY, METERED NASAL 2 TIMES DAILY
Qty: 30 ML | Refills: 0 | Status: SHIPPED | OUTPATIENT
Start: 2025-01-14 | End: 2025-01-19

## 2025-01-14 RX ORDER — PREDNISONE 20 MG/1
20 TABLET ORAL DAILY
Qty: 5 TABLET | Refills: 0 | Status: SHIPPED | OUTPATIENT
Start: 2025-01-14 | End: 2025-01-19

## 2025-01-14 RX ORDER — AMOXICILLIN AND CLAVULANATE POTASSIUM 875; 125 MG/1; MG/1
1 TABLET, FILM COATED ORAL EVERY 12 HOURS
Qty: 20 TABLET | Refills: 0 | Status: SHIPPED | OUTPATIENT
Start: 2025-01-14 | End: 2025-01-24

## 2025-01-14 NOTE — PATIENT INSTRUCTIONS
You must understand that you have received treatment at an Urgent Care facility only, and that you may be  released before all of your medical problems are known or treated. Urgent Care facilities are not equipped to  handle life threatening emergencies. It is recommended that you seek care at an Emergency Department for  further evaluation of worsening or concerning symptoms, or possibly life threatening conditions as  discussed.    Your blood pressure was elevated today. It's important to follow up with a primary care provider (PCP) within 2 weeks to see if you need further tests or treatment. Failure to follow up with a PCP about elevated blood pressure can result in disability or even death.     Please drink plenty of fluids.  Please get plenty of rest.  Please return here or go to the Emergency Department for any concerns or worsening of condition.  If you were prescribed antibiotics, please take them to completion.  If you were given wait & see antibiotics, please wait 5-7 days before taking them, and only take them if your symptoms have worsened or not improved.  If you do begin taking the antibiotics, please take them to completion.  If you do not have Hypertension or any history of palpitations, it is ok to take over the counter Sudafed or Mucinex D or Allegra-D or Claritin-D or Zyrtec-D.  If you do take one of the above, it is ok to combine that with plain over the counter Mucinex or Allegra or Claritin or Zyrtec.  If for example you are taking Zyrtec -D, you can combine that with Mucinex, but not Mucinex-D.  If you are taking Mucinex-D, you can combine that with plain Allegra or Claritin or Zyrtec.   If you do have Hypertension or palpitations, it is safe to take Coricidin HBP for relief of sinus symptoms.  If not allergic, please take over the counter Tylenol (Acetaminophen) and/or Motrin (Ibuprofen) as directed for control of pain and/or fever.  Please follow up with your primary care doctor or  specialist as needed.    If you  smoke, please stop smoking.  Patient Instructions   1.  Take all medications as directed. If you have been prescribed antibiotics, make sure to complete them.   2.  Rest and keep yourself/patient well hydrated. For adults, it is recommended to drink at least 8-10 glasses of water daily.   3.  For patients above 6 months of age who are not allergic to and are not on anticoagulants, you can alternate Tylenol and Motrin every 4-6 hours for fever above 100.4F and/or pain.  For patients less than 6 months of age, allergic to or intolerant to NSAIDS, have gastritis, gastric ulcers, or history of GI bleeds, are pregnant, or are on anticoagulant therapy, you can take Tylenol every 4 hours as needed for fever above 100.4F and/or pain.   4. You should schedule a follow-up appointment with your Primary Care Provider/Pediatrician for recheck in 2-3 days or as directed at this visit.   5.  If your condition fails to improve in a timely manner, you should receive another evaluation by your Primary Care Provider/Pediatrician to discuss your concerns or return to urgent care for a recheck.  If your condition worsens at any time, you should report immediately to your nearest Emergency Department for further evaluation. **You must understand that you have received Urgent Care treatment only and that you may be released before all of your medical problems are known or treated. You, the patient, are responsible to arrange for follow-up care as instructed.

## 2025-01-14 NOTE — LETTER
January 14, 2025      [facility] [place]  [address]  [place] [address]  Phone: [phone]  Fax: [phone]       Patient: Kurt Devlin   YOB: 1995  Date of Visit: 01/14/2025    To Whom It May Concern:    Hazel Devlin  was at [facility] on 01/14/2025. The patient may return to work/school on 1/15/25  with no restrictions. If you have any questions or concerns, or if I can be of further assistance, please do not hesitate to contact me.    Sincerely,    Courtney R Schladweiler, MA

## 2025-01-14 NOTE — PROGRESS NOTES
"Subjective:      Patient ID: Kurt Devlin is a 29 y.o. male.    Vitals:  height is 5' 10" (1.778 m) and weight is 81.6 kg (180 lb). His oral temperature is 98.6 °F (37 °C). His blood pressure is 150/89 (abnormal) and his pulse is 69. His respiration is 18 and oxygen saturation is 99%.     Chief Complaint: Fever    Pt reports headache near his eyes, sinus pressure, vomiting, and fever last night. Stated that he was here on here on Friday. Tested for flu and covid and he was negative. His son that lives with him tested pos for flu last week. Pt reports that his fever was 101.9 last night. Denies diarrhea.     Headache   This is a new problem. The current episode started in the past 7 days. The problem occurs constantly. The problem has been gradually worsening. The pain is located in the Temporal (eyes) region. The pain does not radiate. The pain quality is not similar to prior headaches. The quality of the pain is described as aching and stabbing. The pain is at a severity of 10/10. The pain is severe. Associated symptoms include anorexia, dizziness, eye pain, a fever, insomnia, photophobia, sinus pressure and vomiting. Pertinent negatives include no abdominal pain, coughing, muscle aches, phonophobia, rhinorrhea, sore throat or tinnitus. The symptoms are aggravated by bright light. Treatments tried: motrin. The treatment provided no relief. There is no history of hypertension, migraine headaches or migraines in the family.       Constitution: Positive for fever.   HENT:  Positive for sinus pressure. Negative for tinnitus and sore throat.    Eyes:  Positive for eye pain and photophobia.   Respiratory:  Negative for cough.    Gastrointestinal:  Positive for vomiting. Negative for abdominal pain.   Neurological:  Positive for dizziness and headaches. Negative for history of migraines.   Psychiatric/Behavioral:  The patient has insomnia.       Objective:     Physical Exam   Constitutional:  Non-toxic appearance. " He appears ill. No distress.      Comments:Pt seated upright on exam table and answering questions.      HENT:   Head: Normocephalic and atraumatic.   Ears:   Right Ear: Tympanic membrane, external ear and ear canal normal.   Left Ear: Tympanic membrane, external ear and ear canal normal.   Nose: Congestion present. No rhinorrhea. Right sinus exhibits maxillary sinus tenderness and frontal sinus tenderness. Left sinus exhibits maxillary sinus tenderness and frontal sinus tenderness.   Mouth/Throat: Uvula is midline. Mucous membranes are moist. Posterior oropharyngeal erythema present. No posterior oropharyngeal edema. Oropharynx is clear.   Eyes: Conjunctivae are normal.   Neck: Neck supple.      Comments: No nuchal rigidity.  No neck rigidity present.   Cardiovascular: Normal rate and regular rhythm.   Pulmonary/Chest: Effort normal. No respiratory distress. He has no wheezes.   Abdominal: Normal appearance.   Neurological: no focal deficit. He is alert.   Skin: Skin is warm, dry and not diaphoretic.   Psychiatric: His behavior is normal. Judgment and thought content normal.   Nursing note and vitals reviewed.    Results for orders placed or performed in visit on 01/14/25   POCT Influenza A/B MOLECULAR    Collection Time: 01/14/25  8:30 AM   Result Value Ref Range    POC Molecular Influenza A Ag Negative Negative    POC Molecular Influenza B Ag Negative Negative     Acceptable Yes    SARS Coronavirus 2 Antigen, POCT Manual Read    Collection Time: 01/14/25  8:33 AM   Result Value Ref Range    SARS Coronavirus 2 Antigen Negative Negative     Acceptable Yes          Assessment:     1. Acute non-recurrent pansinusitis    2. Fever, unspecified fever cause    3. Elevated blood pressure reading    4. Frontal headache        Plan:       Acute non-recurrent pansinusitis  -     amoxicillin-clavulanate 875-125mg (AUGMENTIN) 875-125 mg per tablet; Take 1 tablet by mouth every 12 (twelve) hours.  for 10 days  Dispense: 20 tablet; Refill: 0  -     ipratropium (ATROVENT) 21 mcg (0.03 %) nasal spray; 2 sprays by Each Nostril route 2 (two) times daily. for 5 days  Dispense: 30 mL; Refill: 0    Fever, unspecified fever cause  -     POCT Influenza A/B MOLECULAR  -     SARS Coronavirus 2 Antigen, POCT Manual Read    Elevated blood pressure reading    Frontal headache  -     predniSONE (DELTASONE) 20 MG tablet; Take 1 tablet (20 mg total) by mouth once daily. for 5 days  Dispense: 5 tablet; Refill: 0           Afebrile in clinic. Covid, flu are negative. Discussed results with patient.    Please drink plenty of fluids.  Please get plenty of rest.  Please return here or go to the Emergency Department for any concerns or worsening of condition.  If you were prescribed antibiotics, please take them to completion.  If you were given wait & see antibiotics, please wait 5-7 days before taking them, and only take them if your symptoms have worsened or not improved.  If you do begin taking the antibiotics, please take them to completion.  If you do not have Hypertension or any history of palpitations, it is ok to take over the counter Sudafed or Mucinex D or Allegra-D or Claritin-D or Zyrtec-D.  If you do take one of the above, it is ok to combine that with plain over the counter Mucinex or Allegra or Claritin or Zyrtec.  If for example you are taking Zyrtec -D, you can combine that with Mucinex, but not Mucinex-D.  If you are taking Mucinex-D, you can combine that with plain Allegra or Claritin or Zyrtec.   If you do have Hypertension or palpitations, it is safe to take Coricidin HBP for relief of sinus symptoms.  If not allergic, please take over the counter Tylenol (Acetaminophen) and/or Motrin (Ibuprofen) as directed for control of pain and/or fever.  Please follow up with your primary care doctor or specialist as needed.    If you  smoke, please stop smoking.  Patient Instructions   1.  Take all medications as  directed. If you have been prescribed antibiotics, make sure to complete them.   2.  Rest and keep yourself/patient well hydrated. For adults, it is recommended to drink at least 8-10 glasses of water daily.   3.  For patients above 6 months of age who are not allergic to and are not on anticoagulants, you can alternate Tylenol and Motrin every 4-6 hours for fever above 100.4F and/or pain.  For patients less than 6 months of age, allergic to or intolerant to NSAIDS, have gastritis, gastric ulcers, or history of GI bleeds, are pregnant, or are on anticoagulant therapy, you can take Tylenol every 4 hours as needed for fever above 100.4F and/or pain.   4. You should schedule a follow-up appointment with your Primary Care Provider/Pediatrician for recheck in 2-3 days or as directed at this visit.   5.  If your condition fails to improve in a timely manner, you should receive another evaluation by your Primary Care Provider/Pediatrician to discuss your concerns or return to urgent care for a recheck.  If your condition worsens at any time, you should report immediately to your nearest Emergency Department for further evaluation. **You must understand that you have received Urgent Care treatment only and that you may be released before all of your medical problems are known or treated. You, the patient, are responsible to arrange for follow-up care as instructed.

## 2025-02-14 ENCOUNTER — OFFICE VISIT (OUTPATIENT)
Dept: URGENT CARE | Facility: CLINIC | Age: 30
End: 2025-02-14
Payer: COMMERCIAL

## 2025-02-14 VITALS
HEART RATE: 56 BPM | HEIGHT: 70 IN | OXYGEN SATURATION: 99 % | TEMPERATURE: 99 F | WEIGHT: 181.88 LBS | RESPIRATION RATE: 19 BRPM | SYSTOLIC BLOOD PRESSURE: 127 MMHG | DIASTOLIC BLOOD PRESSURE: 82 MMHG | BODY MASS INDEX: 26.04 KG/M2

## 2025-02-14 DIAGNOSIS — J06.9 VIRAL URI: Primary | ICD-10-CM

## 2025-02-14 LAB
CTP QC/QA: YES
POC MOLECULAR INFLUENZA A AGN: NEGATIVE
POC MOLECULAR INFLUENZA B AGN: NEGATIVE

## 2025-02-14 RX ORDER — IPRATROPIUM BROMIDE 21 UG/1
2 SPRAY, METERED NASAL 3 TIMES DAILY
Qty: 30 ML | Refills: 0 | Status: SHIPPED | OUTPATIENT
Start: 2025-02-14

## 2025-02-14 NOTE — PROGRESS NOTES
"Subjective:      Patient ID: Kurt Devlin is a 29 y.o. male.    Vitals:  height is 5' 10" (1.778 m) and weight is 82.5 kg (181 lb 14.1 oz). His oral temperature is 98.5 °F (36.9 °C). His blood pressure is 127/82 and his pulse is 56 (abnormal). His respiration is 19 and oxygen saturation is 99%.     Chief Complaint: Sinus Problem    28 yo M here with 1 day hx of sore throat, congestion, cough and body aches.  Pt reports his mother has the flu.  He would like to be tested for flu and also requests a work excuse. Declines testing for Covid.     Sinus Problem  This is a new problem. The current episode started yesterday. The problem has been gradually worsening since onset. There has been no fever. His pain is at a severity of 5/10. The pain is moderate. Associated symptoms include chills, congestion, coughing, headaches and a sore throat. Pertinent negatives include no shortness of breath. Treatments tried: Robitussin.       Constitution: Positive for chills. Negative for activity change and appetite change.   HENT:  Positive for congestion and sore throat.    Respiratory:  Positive for cough. Negative for shortness of breath and wheezing.    Gastrointestinal:  Negative for nausea, vomiting and diarrhea.   Neurological:  Positive for headaches.      Objective:     Physical Exam   Constitutional: He is oriented to person, place, and time.  Non-toxic appearance. He does not appear ill. No distress. normal  HENT:   Head: Normocephalic and atraumatic.   Ears:   Right Ear: Tympanic membrane, external ear and ear canal normal.   Left Ear: Tympanic membrane, external ear and ear canal normal.   Nose: Rhinorrhea and congestion present.   Mouth/Throat: Posterior oropharyngeal erythema present. No oropharyngeal exudate.   Eyes: Conjunctivae are normal.   Cardiovascular: Regular rhythm. Bradycardia present.   Pulmonary/Chest: Effort normal and breath sounds normal.   Abdominal: Normal appearance.   Neurological: He is " alert and oriented to person, place, and time.   Skin: Skin is warm, dry and not diaphoretic. Capillary refill takes less than 2 seconds.   Psychiatric: His behavior is normal.   Nursing note and vitals reviewed.      Assessment:     1. Viral URI      Results for orders placed or performed in visit on 02/14/25   POCT Influenza A/B Molecular    Collection Time: 02/14/25  8:32 AM   Result Value Ref Range    POC Molecular Influenza A Ag Negative Negative    POC Molecular Influenza B Ag Negative Negative     Acceptable Yes        Plan:       Viral URI  -     POCT Influenza A/B Molecular  -     ipratropium (ATROVENT) 21 mcg (0.03 %) nasal spray; 2 sprays by Each Nostril route 3 (three) times daily.  Dispense: 30 mL; Refill: 0      Patient Instructions   UPPER RESPIRATORY INFECTIONS    An upper respiratory infection can affect your nose, throat, ears, and sinuses.  They are usually caused by viruses.    URIs are contagious and may be spread to others through coughing, sneezing, or close contact. The virus can also stay on objects and surfaces. You can become infected if you touch the object or surface and then touch your eyes, mouth, or nose.    Viruses do not get better with antibiotics. Most people get better in 7 to 14 days. You may continue to cough for 2 to 3 weeks.      Criteria for antibiotics include:  Upper respiratory infections lasting longer than 10-14 days without improvement.  New or worsening symptoms after 5 to 7 days  Worsening symptoms after initial improvement.   Co-existing infection such as Acute Otitis Media (ear infection).     The use of antibiotics for nonbacterial upper respiratory infections has resulted in a severe problem with the emergence of bacteria which are resistant to multiple forms of antibiotics, and some bacteria are currently only treatable with intravenous antibiotics.    The following are suggestions to help you with upper respiratory symptoms.    Body  Aches/Pains/Fever  For patients who are not allergic to and are not on anticoagulants, you can alternate Tylenol every 4 hours and Motrin every 6 hours for fever above 100.4F and/or pain.  For patients who are allergic or intolerant to NSAIDS, have gastritis, gastric ulcers, or history of GI bleeds, are pregnant, or are on anticoagulant therapy, you can take Tylenol every 4 hours as needed for fever above 100.4F and/or pain.    CONGESTION:    Nasal Saline   Nasal saline is available over the counter. There are several different commercial preparations such as Ocean spray and Ayr spray. There is no limit on the use of Nasal saline. Saline is used by snorting the mist up into the nose then later gently blowing the nose to get rid of any secretions that it has loosened.    Nasal irrigation   Flushing the nose and sinuses with a saline solution several times per day can decrease pain associated with congestion and shorten the duration of symptoms.     Decongestant Nasal Sprays  Over-the-counter decongestant nasal spray such as Afrin, may be helpful as an initial step in treating upper respiratory infections. This spray can be used for up to approximately 3 days and is used no more than twice per day. Topical nasal decongestant spray for longer than 5 days will result in a physical addiction, in which the nasal lining will become significantly swollen and irritated until the spray is used again. They May also cause elevated blood pressure.    Nasal Steroids  Nasal steroids, such as Flonase, can be beneficial and help reduce swelling inside the nose. These drugs have few side effects and relieve symptoms in most people. Unfortunately, they do not begin to work for 2-3 days and do not reach their maximum benefit for approximately 2-3 weeks.   There are several nasal steroids available by prescription as well as a few that can be purchased without a prescription (over the counter). These drugs are all effective but differ  in how frequently they must be used and how much they cost.    Nasal anticholinergics  Ipratropium bromide (nasal spray) is available by prescription and can be very effective in decreasing the symptom of runny nose and other related symptoms (eg, post-nasal drainage, sore throat). These sprays, like all medications, can interact with other medications, so it is important that your complete medication list be reviewed by your physician before you take this medication.    If you develop a bloody nose, stop using the medication immediately.    Oral Decongestant  Use pseudoephedrine (behind the counter) for sinus pressure and congestion- Pseudoephedrine 30 mg up to 240 mg /day. Common brands include Sudafed, Zephrex-D Wal-phed.  Warning: It can raise your blood pressure and give you palpitations, avoid with history of high blood pressure, palpitations, and severe cardiac disease.  Coricidin HBP is okay to use if you have high blood pressure.     Mucous Thinners/Decongestant Combination   Mucous thinners and decongestants are used to shrink down the tissues and promote sinus drainage. There are multiple prescription and over-the-counter medications available. A mucous thinner will tend to be drying unless you are also drinking plenty of water when taking these. If you have high blood pressure, it is very important to monitor your pressure while on decongestants. The mucous thinner/decongestant combinations are typically given twice per day. However, some people will be unable to tolerate these at night and should only take them once per day.    CLEAR RUNNY NOSE AND ALLERGIC RHINITIS:  Use an antihistamine to help dry you out or nasal anticholinergics as mentioned above.     Antihistamines  Antihistamines are available both over the counter and as a prescription. There are also various decongestant and antihistamine combinations available such as Claritin, Allegra, and Zyrtec. It is best to take any  antihistamine-decongestant combination in the morning to avoid insomnia. Zyrtec should probably be taken at night, to reduce the chance of sleepiness during the daytime. If there is a significant infection present and secretions are already thickened, it is recommended to discontinue antihistamines and use a mucous thinner/decongestant combination.    Oral Steroids  Oral steroids can be used with more severe infections. Often, they are the only medications that will reduce the symptoms of pressure and allow the nasal sinuses to drain. These are best taken on a full stomach and earlier in the day is better. They may give you some irritability, stomach upset, or hyperactivity. This can also interfere with sleep.     A person who has high blood pressure or diabetes should be very careful and monitor their blood pressure or blood glucose while taking steroids. Steroids can have multiple side effects especially when taken long-term. Short-term doses are usually very well tolerated and effective in controlling the symptoms associated with sinus infections and severe allergies. The use of steroids for greater than approximately seven days requires a tapering down to discontinue them. You should not abruptly stop your steroid if you have been taking the same dose for greater than 7 days.     Maintain adequate hydration - Rest and keep yourself/patient well hydrated. For adults, it is recommended to drink at least 8 glasses of water daily.  This may help thin secretions and soothe the respiratory mucosa.     SORE THROAT:  Perform warm, saltwater gargles (1/2 tsp salt to 1 cup warm water) to help reduce inflammation and throat discomfort. Chloraseptic sprays and throat lozenges will also help with your throat pain.    COUGH:  A viral cough may linger for 3 to 4 weeks but should steadily improve over time. Coughing is the body's natural way to clear mucus and help get rid of bacteria and viruses. Therefore, cough suppressants  are usually not recommended.      Use Mucinex (guaifenesin) up to 2400mg/day to help clear and break up/loosen mucus/congestion from the chest when you have a cold or flu.      Common cough suppressants include the ingredient dextromethorphan or DM, (such as Mucinex DM) available over the counter and can be used for cough to stop the tickle in the back of your throat.      ? Honey may be beneficial, especially on nocturnal cough 1 to 2 teaspoons can be taken straight or diluted in tea, juice or other liquid.    The antioxidants in honey are an important contributor to its decongestant properties. Darker honey contains more antioxidants. Buckwheat and avocado honey are particularly good choices. If these honeys are not available in your area, choose the darkest honey you can find.    It is important to follow up for Re-evaluation if new or worsening symptoms develop or symptoms exceed the expected duration of common cold.     Worsening or persistent symptoms may indicate the development of complications or the need to consider a diagnosis other than the common cold requiring an antibiotic. (eg, acute bacterial sinusitis, pneumonia, pertussis).    Go to Emergency Department or call 911 if you develop new or worsening symptoms including but not limited to:  Trouble breathing.  New or worsening chest discomfort.  Feel confused or disoriented.  Vomiting and can't keep liquids down.  Develop signs of fluid loss, such as dark-colored urine and muscle cramps.    **You must understand that you have received Urgent Care treatment only and that you may be released before all your medical problems are known or treated. You, the patient, are responsible to arrange for follow-up care as instructed.

## 2025-02-14 NOTE — LETTER
February 14, 2025      [facility]  [address]  [place] [address]  Phone: [phone]  Fax: [phone]       Patient: Kurt Devlin   YOB: 1995  Date of Visit: 02/14/2025    To Whom It May Concern:    Hazel Devlin  was at [facility] on 02/14/2025. The patient may return to work/school on 2/16/25 with no restrictions. If you have any questions or concerns, or if I can be of further assistance, please do not hesitate to contact me.    Sincerely,        Nadeen Huff NP

## 2025-03-12 ENCOUNTER — OFFICE VISIT (OUTPATIENT)
Dept: URGENT CARE | Facility: CLINIC | Age: 30
End: 2025-03-12
Payer: COMMERCIAL

## 2025-03-12 VITALS
SYSTOLIC BLOOD PRESSURE: 133 MMHG | BODY MASS INDEX: 26.04 KG/M2 | RESPIRATION RATE: 17 BRPM | DIASTOLIC BLOOD PRESSURE: 88 MMHG | TEMPERATURE: 98 F | OXYGEN SATURATION: 99 % | HEIGHT: 70 IN | HEART RATE: 62 BPM | WEIGHT: 181.88 LBS

## 2025-03-12 DIAGNOSIS — R11.2 NAUSEA VOMITING AND DIARRHEA: Primary | ICD-10-CM

## 2025-03-12 DIAGNOSIS — R19.7 NAUSEA VOMITING AND DIARRHEA: Primary | ICD-10-CM

## 2025-03-12 DIAGNOSIS — J02.9 SORE THROAT: ICD-10-CM

## 2025-03-12 LAB
CTP QC/QA: YES
MOLECULAR STREP A: NEGATIVE

## 2025-03-12 PROCEDURE — 99213 OFFICE O/P EST LOW 20 MIN: CPT | Mod: S$GLB,,, | Performed by: PHYSICIAN ASSISTANT

## 2025-03-12 PROCEDURE — 87651 STREP A DNA AMP PROBE: CPT | Mod: QW,S$GLB,, | Performed by: PHYSICIAN ASSISTANT

## 2025-03-12 RX ORDER — ONDANSETRON 4 MG/1
4 TABLET, ORALLY DISINTEGRATING ORAL EVERY 6 HOURS PRN
Qty: 20 TABLET | Refills: 0 | Status: SHIPPED | OUTPATIENT
Start: 2025-03-12

## 2025-03-12 NOTE — PROGRESS NOTES
"Subjective:      Patient ID: Kurt Devlin is a 29 y.o. male.    Vitals:  height is 5' 10" (1.778 m) and weight is 82.5 kg (181 lb 14.1 oz). His oral temperature is 98.2 °F (36.8 °C). His blood pressure is 133/88 and his pulse is 62. His respiration is 17 and oxygen saturation is 99%.     Chief Complaint: Emesis    Pt states he had an onset of emesis this morning and admits to associated diarrhea.   C/o having vomiting bile 4 times, dizziness, and having upset stomach.  No medication taken.    He also reports that his son was diagnosed with strep yesterday.     Emesis   This is a new problem. The current episode started today. The problem occurs 2 to 4 times per day. The problem has been unchanged. The emesis has an appearance of bile. There has been no fever. Associated symptoms include diarrhea and dizziness. Pertinent negatives include no abdominal pain or fever. Associated symptoms comments: Nausea  . Risk factors include ill contacts. He has tried nothing for the symptoms.       Constitution: Negative for fever.   HENT:  Positive for sore throat.    Gastrointestinal:  Positive for vomiting and diarrhea. Negative for abdominal pain.   Neurological:  Positive for dizziness.      Objective:     Physical Exam   Constitutional: He is oriented to person, place, and time. He appears well-developed. He is cooperative.  Non-toxic appearance. He does not appear ill. No distress.   HENT:   Head: Normocephalic and atraumatic.   Ears:   Right Ear: Hearing, tympanic membrane, external ear and ear canal normal. no impacted cerumen  Left Ear: Hearing, tympanic membrane, external ear and ear canal normal. no impacted cerumen  Nose: Nose normal. No rhinorrhea or congestion.   Mouth/Throat: Mucous membranes are moist. Posterior oropharyngeal erythema present. No oropharyngeal exudate. Oropharynx is clear.   Eyes: Conjunctivae and lids are normal. No scleral icterus.   Neck: Trachea normal and phonation normal. Neck supple. " No edema present. No erythema present. No neck rigidity present.   Cardiovascular: Normal rate, regular rhythm, normal heart sounds and normal pulses.   No murmur heard.Exam reveals no gallop and no friction rub.   Pulmonary/Chest: Effort normal and breath sounds normal. No stridor. No respiratory distress. He has no wheezes. He has no rhonchi. He has no rales.   Abdominal: Normal appearance and bowel sounds are normal. He exhibits no distension and no mass. Soft. There is no rebound, no guarding, no left CVA tenderness and no right CVA tenderness. No hernia.   Neurological: He is alert and oriented to person, place, and time. Coordination normal.   Skin: Skin is dry, intact, not diaphoretic and not pale.   Psychiatric: His speech is normal and behavior is normal. Judgment and thought content normal.   Nursing note and vitals reviewed.      Assessment:     1. Nausea vomiting and diarrhea    2. Sore throat        Plan:       Nausea vomiting and diarrhea  -     ondansetron (ZOFRAN-ODT) 4 MG TbDL; Take 1 tablet (4 mg total) by mouth every 6 (six) hours as needed (nausea/vomiting/diarrhea).  Dispense: 20 tablet; Refill: 0    Sore throat  -     POCT Strep A, Molecular      Results for orders placed or performed in visit on 03/12/25   POCT Strep A, Molecular    Collection Time: 03/12/25  8:41 AM   Result Value Ref Range    Molecular Strep A, POC Negative Negative     Acceptable Yes      Alternate ibuprofen and tylenol as needed for fever/pain/body aches every 4-6 hours. Rest, increase PO fluids. Advance diet as tolerated.   Discussed with patient the importance of f/u with their primary care provider. Urged to go to the ER for any worsening signs or symptoms.

## 2025-03-12 NOTE — LETTER
March 12, 2025      [facility] [place]  [address]  [place] [address]  Phone: [phone]  Fax: [phone]       Patient: Kurt Devlin   YOB: 1995  Date of Visit: 03/12/2025    To Whom It May Concern:    Hazel Devlin  was at [facility] on 03/12/2025. The patient may return to work/school in 2-3 days as long as he is fever free and symptoms improve with no restrictions. If you have any questions or concerns, or if I can be of further assistance, please do not hesitate to contact me.    Sincerely,    Mara Kaplan PA-C

## 2025-04-09 ENCOUNTER — OFFICE VISIT (OUTPATIENT)
Dept: URGENT CARE | Facility: CLINIC | Age: 30
End: 2025-04-09
Payer: COMMERCIAL

## 2025-04-09 VITALS
RESPIRATION RATE: 18 BRPM | BODY MASS INDEX: 25.91 KG/M2 | SYSTOLIC BLOOD PRESSURE: 131 MMHG | OXYGEN SATURATION: 98 % | DIASTOLIC BLOOD PRESSURE: 85 MMHG | HEIGHT: 70 IN | HEART RATE: 62 BPM | WEIGHT: 181 LBS | TEMPERATURE: 98 F

## 2025-04-09 DIAGNOSIS — S91.111A LACERATION OF RIGHT GREAT TOE WITHOUT FOREIGN BODY PRESENT OR DAMAGE TO NAIL, INITIAL ENCOUNTER: Primary | ICD-10-CM

## 2025-04-09 PROCEDURE — 99213 OFFICE O/P EST LOW 20 MIN: CPT | Mod: S$GLB,,, | Performed by: NURSE PRACTITIONER

## 2025-04-09 RX ORDER — DOXYCYCLINE 100 MG/1
100 CAPSULE ORAL EVERY 12 HOURS
Qty: 20 CAPSULE | Refills: 0 | Status: SHIPPED | OUTPATIENT
Start: 2025-04-09 | End: 2025-04-19

## 2025-04-09 NOTE — PROGRESS NOTES
"Subjective:      Patient ID: Kurt Devlin is a 29 y.o. male.    Vitals:  height is 5' 10" (1.778 m) and weight is 82.1 kg (181 lb). His oral temperature is 98.3 °F (36.8 °C). His blood pressure is 131/85 and his pulse is 62. His respiration is 18 and oxygen saturation is 98%.     Chief Complaint: Laceration    30 y/o male presents to clinic with laceration on his right big toe and right pinky toe. This happened yesterday while he was fishing in [place] and he slipped on a rock and his feet were in the water. His big toe is worse than his pinky toe. States they burn and he wants to make sure they don't get infected. Unsure of exactly when he got his tetanus but believes it was 2-3 years ago.     Laceration   The incident occurred 12 to 24 hours ago. The laceration is located on the Right foot. Injury mechanism: rock. The pain is at a severity of 5/10. The pain is moderate. The pain has been Constant since onset. His tetanus status is unknown.       Constitution: Negative for activity change, appetite change and fever.   Neck: Negative for neck pain.   Respiratory:  Negative for cough.    Gastrointestinal:  Negative for nausea and vomiting.   Skin:  Positive for laceration.      Objective:     Physical Exam   Constitutional: He is oriented to person, place, and time.  Non-toxic appearance. He does not appear ill. No distress. normal  HENT:   Head: Normocephalic.   Ears:   Right Ear: Tympanic membrane normal.   Left Ear: Tympanic membrane normal.   Eyes: Conjunctivae are normal. Pupils are equal, round, and reactive to light.   Cardiovascular: Normal rate.   Pulmonary/Chest: Effort normal and breath sounds normal.   Abdominal: Normal appearance and bowel sounds are normal.   Musculoskeletal: Normal range of motion.         General: Normal range of motion.        Feet:    Neurological: no focal deficit. He is alert, oriented to person, place, and time and at baseline.   Skin: Skin is warm. Capillary refill takes " less than 2 seconds.   Nursing note and vitals reviewed.      Assessment:     1. Laceration of right great toe without foreign body present or damage to nail, initial encounter        Plan:       Laceration of right great toe without foreign body present or damage to nail, initial encounter    Other orders  -     doxycycline (VIBRAMYCIN) 100 MG Cap; Take 1 capsule (100 mg total) by mouth every 12 (twelve) hours. for 10 days  Dispense: 20 capsule; Refill: 0      WOUND CARE/ABRASIONS    Wash the wound(s) gently daily with warm water and mild soap.      Next, apply Bactroban/mupirocin (if prescribed) or Vaseline/petroleum jelly, or Aquaphor.  Avoid Neosporin or bacitracin, as this can cause allergic reactions, making the wound itchy, red, and bumpy.      After application of ointment, bandage the wound using a topical non-stick dressing. This can be purchased over the counter. The wound may have weeping of clear fluid (exudates) which seeps through the non-stick pad, so place a layer of dry, absorbent gauze over this to handle any exudates.    This type of dressing should be removed and re-applied twice daily initially, then reduced to once daily when the weeping of clear fluid decreases.    Do not let the wound get dry and crusted.  Keeping it moist with the Vaseline/petroleum will help it heal faster.    Watch for signs or symptoms of infection such as increasing pain at the site of your wound; redness spreading around the wound edges (although a thin, light rim of redness around the wound edges initially can be normal); white, thick or foul-smelling discharge from the wound base; or unexplained fevers.  If you suspect these symptoms or have other unexplained symptoms or concerns, seek out consultation with a qualified health care professional immediately.    Once your wound is no longer oozing and raw, and shiny new pink healthy skin is visible, begin:  Gentle rubbing of the scar(s) with Vaseline/petroleum jelly to  help the scar mature and flatten faster.  Silicone gel sheeting such as Curad Scar Therapy can be used if it is raised.  Leave this on for 24 hours per day (you can leave it off for an hour or so around bathing).  If you are not able to do this, then use it nightly.  Replace the pad with a new one when old one falls off.    As the wound heals, keep in mind:  In the first few weeks, you may not feel satisfied with the appearance of your wound.  Don't get too concerned.  You won't see the final appearance of your scar for 6 months to a year, and it is very likely to fade and improve with time.    In the meantime, strict sun protection is essential. The sun can permanently darken scars.  Wear sun protective clothing and sunscreen which is at least SPF 30, broad spectrum (including UVA and UVB) with either zinc or titanium or both as active ingredients.  A thick layer and frequent application when in the sun for prolonged periods of time is essential.  You are also better off avoiding peak sun hours altogether!    [email or web address]    Your Laceration Aftercare Instructions    A laceration, or cut, is an open wound through the skin. These can be due to many different causes and can come in many different forms. The depth, location, and cause of your wound, among multiple other factors (including your preference) plays a role in the treatment choices made today.  The main purposes of these treatments are to stop the bleeding, and to help the wound heal with reduced scarring.  Care at home depends on the type of wound and method used to close or treat the wound such as sutures, (stitches), staples, tape (steri-strips), or skin glue (Dermabond).   Keep the wound clean and dry for the first 24 hours.  The wound has probably had a bandage applied (unless skin glue was used, or if the wound was in a hard-to-bandage area, like your hair).  You can remove the bandage after 12-24  hours at your convenience.      If your wound was sutured or stapled:  You can clean the area with a mild soap and water 2 times a day. Don't use hydrogen peroxide, iodine-based solutions, or alcohol, which can slow healing, and will probably be painful!  Cover the wound with a thin layer of Bactroban/mupirocin.  Avoid Neosporin or bacitracin, as this can cause allergic reactions, making the wound itchy, red, and bumpy.  You can continue to apply the antibiotic twice daily until the wound scars and dries out, or the sutures/staples are removed.    Sutures and staples should be removed within 5 days to 2 weeks, depending on where on your body they are located.  One exception is if absorbable sutures were used-these sometimes don't require a visit for removal-I'll advise you if this was the case.      Optimal time frame for suture removal    Face     5 days  Scalp     7 days  Chest and arms   8-10  Back and Legs   10-14  Hands/fingers or Feet  10-14 days  High tension areas (joints)  10-14 days  Palms or soles   14-21 days    Keep in mind, specific situations related to your wound as well as your other medical conditions play into the optimal removal time-this is just a guideline!    If your skin was glued:  Skin adhesive glues such as Dermabond are sometimes used instead of sutures/stitches to close cuts. When the adhesive dries, it forms a film that holds the edges of the cut together. Skin adhesives are sometimes called liquid stitches.  Typically, bandages are not placed over a wound closed with tissue adhesive glue-you can think of the glue as a dressing. Avoid lotions, ointments, etc.  This is because creams and ointments can cause the glue to prematurely slough off.  You may have some swelling, color changes, and bloody crusting on or around the wound for 2 or 3 days. This can be normal and doesn't mean the glue isn't working.  The glue will naturally slough off in about 5-7 days. At this time, scar tissue  will be forming under the surface of the wound and your body will do the rest of the work of healing.  Some other important points are:  Do not scratch, rub, or pick at the adhesive.  Do not put tape directly over the adhesive.  You can shower with a skin adhesive in place, but do not soak the area in water. Do not go swimming. Be sure to gently dry the area after it gets wet.    If your hair strands were glued (hair apposition):  Sometimes, your own hair strands are used by twisting and gluing the strands of hair together, bringing the edges of a scalp laceration together.  The advantage of this is that you won't have to come back to have stitches or staples removed.  Please try to keep the area clean and dry and avoid shampoos or hair products on the area which can cause the glue to come loose prematurely.  The glue will naturally dissolve in about 5 days, after which time your hair strands will unravel.    If your wound was closed with tape:  Tape strips have the advantage of being less painful to apply and lower risk of infection, but do require more caution on your part, as they are more fragile than other skin closure techniques.  It's important to  keep the tape clean and dry  avoid picking at the tape or rubbing the area  avoid soaking in water (showering is okay-bathing is not)  The tape strips will fall off on their own in about 5-7 days (if they don't, you can gently remove them or soak the wound in water at this time to loosen them).  At this time, scar tissue will be forming under the surface of the wound and your body will do the rest of the work of healing.    If your cut was left open:  Many cuts are often left open.  This can be for several reasons, but often is because  the risk of infection is too high to primarily close the wound (many reasons for this)  the scar that is expected without closure is cosmetically acceptable to you  This is fine, and in many cases, advisable.      After your  stitches/staples/glue/tape come off:  It's very important to recognize that the most vulnerable time for a wound are the days right after the closure material has been removed.  This is when a wound is most susceptible to dehiscence (opening up).  Thus, be careful to avoid activities that could put your wound under unnecessary and excessive tension forces.  Any wound that passes through the full thickness of the skin will cause a permanent scar.  This scar may be very prominent at first but tends to gradually improve over the course of about a year. Protect your healing wound from excessive sunlight, which can darken the final appearance of a scar.  If a year has passed, and you are still not satisfied with the appearance of the scar, you can contact a plastic surgeon to discuss scar revision.    When should you call for help?  Call your doctor immediately or seek medical care in an emergency department if:  Your wound is causing new pain, or the pain gets worse.  Some pain is normal with a wound, but do not ignore pain that is getting worse instead of better. You could have an infection.  The cut starts to bleed, and blood soaks through the bandage. Oozing small amounts of blood is normal.  The skin near the cut is cold or pale or changes color.  You have tingling, weakness, or numbness near the cut that we did not address during your visit.  You have trouble moving the area near the cut that we did not address during the visit.  You have increased pain, swelling, warmth, or redness around the cut, which could be a sign of infection.  There are red streaks leading from the cut, or there is pus draining from the cut.  You have a fever that you can't explain for another reason (like having a common cold).    What else do I need to know/do?  Being treated in an urgent care is only one step in your treatment. Even if you feel better, you still need to go to all suggested follow-up appointments and take medicines  exactly as directed. This will help you recover and help prevent future problems.    Follow-up care is a key part of your treatment and safety.  Be sure to make and go to all appointments, and call your doctor if things are not going as expected.  If you've been prescribed antibiotics, take them as directed.  Do not stop taking them just because you feel better. If you don't finish the course, you can breed resistant infections, which are harder to treat!    Take good care of your wound at home to help it heal quickly and reduce your chance of infection. While your wound is healing, avoid any activity that could cause your wound to reopen.  Use common sense when it comes to activities.  Also, avoid unnecessary bacteria exposure-for example, swimming in an ocean or hot tub, or wearing shoes or gloves over a wound for a long period of time (which promotes bacterial growth).       If your condition worsens at any time, you should report immediately to your nearest Emergency Department for further evaluation. **You must understand that you have received Urgent Care treatment only and that you may be released before all of your medical problems are known or treated. You, the patient, are responsible to arrange for follow-up care as instructed.

## 2025-04-09 NOTE — PATIENT INSTRUCTIONS
WOUND CARE/ABRASIONS    Wash the wound(s) gently daily with warm water and mild soap.      Next, apply Bactroban/mupirocin (if prescribed) or Vaseline/petroleum jelly, or Aquaphor.  Avoid Neosporin or bacitracin, as this can cause allergic reactions, making the wound itchy, red, and bumpy.      After application of ointment, bandage the wound using a topical non-stick dressing. This can be purchased over the counter. The wound may have weeping of clear fluid (exudates) which seeps through the non-stick pad, so place a layer of dry, absorbent gauze over this to handle any exudates.    This type of dressing should be removed and re-applied twice daily initially, then reduced to once daily when the weeping of clear fluid decreases.    Do not let the wound get dry and crusted.  Keeping it moist with the Vaseline/petroleum will help it heal faster.    Watch for signs or symptoms of infection such as increasing pain at the site of your wound; redness spreading around the wound edges (although a thin, light rim of redness around the wound edges initially can be normal); white, thick or foul-smelling discharge from the wound base; or unexplained fevers.  If you suspect these symptoms or have other unexplained symptoms or concerns, seek out consultation with a qualified health care professional immediately.    Once your wound is no longer oozing and raw, and shiny new pink healthy skin is visible, begin:  Gentle rubbing of the scar(s) with Vaseline/petroleum jelly to help the scar mature and flatten faster.  Silicone gel sheeting such as Curad Scar Therapy can be used if it is raised.  Leave this on for 24 hours per day (you can leave it off for an hour or so around bathing).  If you are not able to do this, then use it nightly.  Replace the pad with a new one when old one falls off.    As the wound heals, keep in mind:  In the first few weeks, you may not feel satisfied with the appearance of your wound.  Don't get too  concerned.  You won't see the final appearance of your scar for 6 months to a year, and it is very likely to fade and improve with time.    In the meantime, strict sun protection is essential. The sun can permanently darken scars.  Wear sun protective clothing and sunscreen which is at least SPF 30, broad spectrum (including UVA and UVB) with either zinc or titanium or both as active ingredients.  A thick layer and frequent application when in the sun for prolonged periods of time is essential.  You are also better off avoiding peak sun hours altogether!    [email or web address]    Your Laceration Aftercare Instructions    A laceration, or cut, is an open wound through the skin. These can be due to many different causes and can come in many different forms. The depth, location, and cause of your wound, among multiple other factors (including your preference) plays a role in the treatment choices made today.  The main purposes of these treatments are to stop the bleeding, and to help the wound heal with reduced scarring.  Care at home depends on the type of wound and method used to close or treat the wound such as sutures, (stitches), staples, tape (steri-strips), or skin glue (Dermabond).   Keep the wound clean and dry for the first 24 hours.  The wound has probably had a bandage applied (unless skin glue was used, or if the wound was in a hard-to-bandage area, like your hair).  You can remove the bandage after 12-24 hours at your convenience.      If your wound was sutured or stapled:  You can clean the area with a mild soap and water 2 times a day. Don't use hydrogen peroxide, iodine-based solutions, or alcohol, which can slow healing, and will probably be painful!  Cover the wound with a thin layer of Bactroban/mupirocin.  Avoid Neosporin or bacitracin, as this can cause allergic reactions, making the wound itchy, red, and bumpy.  You can continue to apply the antibiotic  twice daily until the wound scars and dries out, or the sutures/staples are removed.    Sutures and staples should be removed within 5 days to 2 weeks, depending on where on your body they are located.  One exception is if absorbable sutures were used-these sometimes don't require a visit for removal-I'll advise you if this was the case.      Optimal time frame for suture removal    Face     5 days  Scalp     7 days  Chest and arms   8-10  Back and Legs   10-14  Hands/fingers or Feet  10-14 days  High tension areas (joints)  10-14 days  Palms or soles   14-21 days    Keep in mind, specific situations related to your wound as well as your other medical conditions play into the optimal removal time-this is just a guideline!    If your skin was glued:  Skin adhesive glues such as Dermabond are sometimes used instead of sutures/stitches to close cuts. When the adhesive dries, it forms a film that holds the edges of the cut together. Skin adhesives are sometimes called liquid stitches.  Typically, bandages are not placed over a wound closed with tissue adhesive glue-you can think of the glue as a dressing. Avoid lotions, ointments, etc.  This is because creams and ointments can cause the glue to prematurely slough off.  You may have some swelling, color changes, and bloody crusting on or around the wound for 2 or 3 days. This can be normal and doesn't mean the glue isn't working.  The glue will naturally slough off in about 5-7 days. At this time, scar tissue will be forming under the surface of the wound and your body will do the rest of the work of healing.  Some other important points are:  Do not scratch, rub, or pick at the adhesive.  Do not put tape directly over the adhesive.  You can shower with a skin adhesive in place, but do not soak the area in water. Do not go swimming. Be sure to gently dry the area after it gets wet.    If your hair strands were glued (hair apposition):  Sometimes, your own hair strands  are used by twisting and gluing the strands of hair together, bringing the edges of a scalp laceration together.  The advantage of this is that you won't have to come back to have stitches or staples removed.  Please try to keep the area clean and dry and avoid shampoos or hair products on the area which can cause the glue to come loose prematurely.  The glue will naturally dissolve in about 5 days, after which time your hair strands will unravel.    If your wound was closed with tape:  Tape strips have the advantage of being less painful to apply and lower risk of infection, but do require more caution on your part, as they are more fragile than other skin closure techniques.  It's important to  keep the tape clean and dry  avoid picking at the tape or rubbing the area  avoid soaking in water (showering is okay-bathing is not)  The tape strips will fall off on their own in about 5-7 days (if they don't, you can gently remove them or soak the wound in water at this time to loosen them).  At this time, scar tissue will be forming under the surface of the wound and your body will do the rest of the work of healing.    If your cut was left open:  Many cuts are often left open.  This can be for several reasons, but often is because  the risk of infection is too high to primarily close the wound (many reasons for this)  the scar that is expected without closure is cosmetically acceptable to you  This is fine, and in many cases, advisable.      After your stitches/staples/glue/tape come off:  It's very important to recognize that the most vulnerable time for a wound are the days right after the closure material has been removed.  This is when a wound is most susceptible to dehiscence (opening up).  Thus, be careful to avoid activities that could put your wound under unnecessary and excessive tension forces.  Any wound that passes through the full thickness of the skin will cause a permanent scar.  This scar may be very  prominent at first but tends to gradually improve over the course of about a year. Protect your healing wound from excessive sunlight, which can darken the final appearance of a scar.  If a year has passed, and you are still not satisfied with the appearance of the scar, you can contact a plastic surgeon to discuss scar revision.    When should you call for help?  Call your doctor immediately or seek medical care in an emergency department if:  Your wound is causing new pain, or the pain gets worse.  Some pain is normal with a wound, but do not ignore pain that is getting worse instead of better. You could have an infection.  The cut starts to bleed, and blood soaks through the bandage. Oozing small amounts of blood is normal.  The skin near the cut is cold or pale or changes color.  You have tingling, weakness, or numbness near the cut that we did not address during your visit.  You have trouble moving the area near the cut that we did not address during the visit.  You have increased pain, swelling, warmth, or redness around the cut, which could be a sign of infection.  There are red streaks leading from the cut, or there is pus draining from the cut.  You have a fever that you can't explain for another reason (like having a common cold).    What else do I need to know/do?  Being treated in an urgent care is only one step in your treatment. Even if you feel better, you still need to go to all suggested follow-up appointments and take medicines exactly as directed. This will help you recover and help prevent future problems.    Follow-up care is a key part of your treatment and safety.  Be sure to make and go to all appointments, and call your doctor if things are not going as expected.  If you've been prescribed antibiotics, take them as directed.  Do not stop taking them just because you feel better. If you don't finish the course, you can breed resistant infections, which are harder to treat!    Take good care  of your wound at home to help it heal quickly and reduce your chance of infection. While your wound is healing, avoid any activity that could cause your wound to reopen.  Use common sense when it comes to activities.  Also, avoid unnecessary bacteria exposure-for example, swimming in an ocean or hot tub, or wearing shoes or gloves over a wound for a long period of time (which promotes bacterial growth).       If your condition worsens at any time, you should report immediately to your nearest Emergency Department for further evaluation. **You must understand that you have received Urgent Care treatment only and that you may be released before all of your medical problems are known or treated. You, the patient, are responsible to arrange for follow-up care as instructed.

## 2025-04-17 ENCOUNTER — OFFICE VISIT (OUTPATIENT)
Dept: URGENT CARE | Facility: CLINIC | Age: 30
End: 2025-04-17
Payer: COMMERCIAL

## 2025-04-17 VITALS
RESPIRATION RATE: 18 BRPM | TEMPERATURE: 98 F | HEART RATE: 51 BPM | OXYGEN SATURATION: 98 % | HEIGHT: 70 IN | BODY MASS INDEX: 26.76 KG/M2 | WEIGHT: 186.94 LBS | DIASTOLIC BLOOD PRESSURE: 82 MMHG | SYSTOLIC BLOOD PRESSURE: 133 MMHG

## 2025-04-17 DIAGNOSIS — R21 SKIN RASH: ICD-10-CM

## 2025-04-17 DIAGNOSIS — L29.9 PRURITUS: ICD-10-CM

## 2025-04-17 DIAGNOSIS — B86 SCABIES: Primary | ICD-10-CM

## 2025-04-17 PROCEDURE — 99213 OFFICE O/P EST LOW 20 MIN: CPT | Mod: S$GLB,,,

## 2025-04-17 RX ORDER — PERMETHRIN 50 MG/G
CREAM TOPICAL
Qty: 60 G | Refills: 0 | Status: SHIPPED | OUTPATIENT
Start: 2025-04-17 | End: 2025-04-19

## 2025-04-17 RX ORDER — HYDROXYZINE HYDROCHLORIDE 25 MG/1
25 TABLET, FILM COATED ORAL NIGHTLY
Qty: 7 TABLET | Refills: 0 | Status: SHIPPED | OUTPATIENT
Start: 2025-04-17 | End: 2025-04-19

## 2025-04-17 NOTE — PROGRESS NOTES
"Subjective:      Patient ID: Kurt Devlin is a 29 y.o. male.    Vitals:  height is 5' 10" (1.778 m) and weight is 84.8 kg (186 lb 15.2 oz). His oral temperature is 97.9 °F (36.6 °C). His blood pressure is 133/82 and his pulse is 51 (abnormal). His respiration is 18 and oxygen saturation is 98%.     Chief Complaint: Rash    Patient reports itching all over that began today. Reports rash to bilateral legs. Stated his cat had "bugs" on it and patient noted a "bug" on himself.     Rash  This is a new problem. The current episode started today. The problem is unchanged. The affected locations include the right upper leg, right lower leg, left lower leg, left ankle, left upper leg, left wrist, left hand, left arm, left elbow, back, torso, chest, neck, face and head. The rash is characterized by itchiness. It is unknown if there was an exposure to a precipitant. Pertinent negatives include no cough, diarrhea, fatigue, fever or sore throat. Treatments tried: Benadryl. The treatment provided no relief.       Constitution: Negative for fatigue and fever.   HENT:  Negative for sore throat.    Respiratory:  Negative for cough.    Gastrointestinal:  Negative for diarrhea.   Skin:  Positive for rash and erythema.      Objective:     Physical Exam   Constitutional:  Non-toxic appearance. He does not appear ill. No distress.   HENT:   Head: Normocephalic and atraumatic.   Ears:   Right Ear: External ear normal.   Left Ear: External ear normal.   Nose: Nose normal.   Mouth/Throat: Mucous membranes are moist.   Eyes: Conjunctivae are normal.   Neck: No neck rigidity present.   Cardiovascular: Normal rate.   Abdominal: Normal appearance.   Neurological: He is alert.   Skin: Skin is warm, dry, not diaphoretic and rash. erythema        Psychiatric: His behavior is normal. Judgment and thought content normal.   Nursing note and vitals reviewed.      Assessment:     1. Scabies    2. Pruritus    3. Skin rash        Plan: "       Scabies  -     permethrin (ELIMITE) 5 % cream; Apply cream to body from neck to soles of feet. Wash off cream after 8-14 hours from application. Repeat application process again in 7 days  Dispense: 60 g; Refill: 0    Pruritus  -     hydrOXYzine HCL (ATARAX) 25 MG tablet; Take 1 tablet (25 mg total) by mouth every evening. for 7 days  Dispense: 7 tablet; Refill: 0    Skin rash  -     Ambulatory referral/consult to Dermatology    Apply cream as prescribed and wash off after 8-14 hours. Repeat application again in 7 days. Hydroxyzine can be taken at night time to help with itching. Wash all pets, bedding in warm thomason. Get pet evaluated by    For new or worsening symptoms, go to nearest Emergency Room. Follow up with Dermatology if symptoms persist.   Patient Instructions   1.  Take all medications as directed. If you have been prescribed antibiotics, make sure to complete them.   2.  Rest and keep yourself/patient well hydrated. For adults, it is recommended to drink at least 8-10 glasses of water daily.   3.  For patients above 6 months of age who are not allergic to and are not on anticoagulants, you can alternate Tylenol and Motrin every 4-6 hours for fever above 100.4F and/or pain.  For patients less than 6 months of age, allergic to or intolerant to NSAIDS, have gastritis, gastric ulcers, or history of GI bleeds, are pregnant, or are on anticoagulant therapy, you can take Tylenol every 4 hours as needed for fever above 100.4F and/or pain.   4. You should schedule a follow-up appointment with your Primary Care Provider/Pediatrician for recheck in 2-3 days or as directed at this visit.   5.  If your condition fails to improve in a timely manner, you should receive another evaluation by your Primary Care Provider/Pediatrician to discuss your concerns or return to urgent care for a recheck.  If your condition worsens at any time, you should report immediately to your nearest Emergency Department  for further evaluation. **You must understand that you have received Urgent Care treatment only and that you may be released before all of your medical problems are known or treated. You, the patient, are responsible to arrange for follow-up care as instructed.

## 2025-04-17 NOTE — PATIENT INSTRUCTIONS
You must understand that you have received treatment at an Urgent Care facility only, and that you may be  released before all of your medical problems are known or treated. Urgent Care facilities are not equipped to  handle life threatening emergencies. It is recommended that you seek care at an Emergency Department for  further evaluation of worsening or concerning symptoms, or possibly life threatening conditions as  discussed.     Apply cream as prescribed and wash off after 8-14 hours. Repeat application again in 7 days. Hydroxyzine can be taken at night time to help with itching. Wash all pets, bedding in warm thomason. Get pet evaluated by    For new or worsening symptoms, go to nearest Emergency Room. Follow up with Dermatology if symptoms persist.   Patient Instructions   1.  Take all medications as directed. If you have been prescribed antibiotics, make sure to complete them.   2.  Rest and keep yourself/patient well hydrated. For adults, it is recommended to drink at least 8-10 glasses of water daily.   3.  For patients above 6 months of age who are not allergic to and are not on anticoagulants, you can alternate Tylenol and Motrin every 4-6 hours for fever above 100.4F and/or pain.  For patients less than 6 months of age, allergic to or intolerant to NSAIDS, have gastritis, gastric ulcers, or history of GI bleeds, are pregnant, or are on anticoagulant therapy, you can take Tylenol every 4 hours as needed for fever above 100.4F and/or pain.   4. You should schedule a follow-up appointment with your Primary Care Provider/Pediatrician for recheck in 2-3 days or as directed at this visit.   5.  If your condition fails to improve in a timely manner, you should receive another evaluation by your Primary Care Provider/Pediatrician to discuss your concerns or return to urgent care for a recheck.  If your condition worsens at any time, you should report immediately to your nearest Emergency Department  for further evaluation. **You must understand that you have received Urgent Care treatment only and that you may be released before all of your medical problems are known or treated. You, the patient, are responsible to arrange for follow-up care as instructed.

## 2025-04-19 ENCOUNTER — OFFICE VISIT (OUTPATIENT)
Dept: URGENT CARE | Facility: CLINIC | Age: 30
End: 2025-04-19
Payer: COMMERCIAL

## 2025-04-19 VITALS
RESPIRATION RATE: 16 BRPM | DIASTOLIC BLOOD PRESSURE: 96 MMHG | HEIGHT: 70 IN | TEMPERATURE: 98 F | WEIGHT: 186 LBS | SYSTOLIC BLOOD PRESSURE: 143 MMHG | OXYGEN SATURATION: 98 % | HEART RATE: 68 BPM | BODY MASS INDEX: 26.63 KG/M2

## 2025-04-19 DIAGNOSIS — K52.9 GASTROENTERITIS: Primary | ICD-10-CM

## 2025-04-19 PROCEDURE — 99214 OFFICE O/P EST MOD 30 MIN: CPT | Mod: S$GLB,,,

## 2025-04-19 RX ORDER — ONDANSETRON 4 MG/1
4 TABLET, ORALLY DISINTEGRATING ORAL EVERY 8 HOURS PRN
Qty: 12 TABLET | Refills: 0 | Status: SHIPPED | OUTPATIENT
Start: 2025-04-19

## 2025-04-19 NOTE — LETTER
April 19, 2025      [facility]  [address]  [place] [address]  Phone: [phone]  Fax: [phone]       Patient: Kurt Devlin   YOB: 1995  Date of Visit: 04/19/2025    To Whom It May Concern:    Hazel Devlin  was at [facility] on 04/19/2025. The patient may return to work/school on 4/20/25 with no restrictions. If you have any questions or concerns, or if I can be of further assistance, please do not hesitate to contact me.    Sincerely,      Nadeen Huff NP

## 2025-04-19 NOTE — PROGRESS NOTES
"Subjective:      Patient ID: Kurt Devlin is a 29 y.o. male.    Vitals:  height is 5' 10" (1.778 m) and weight is 84.4 kg (186 lb). His oral temperature is 97.6 °F (36.4 °C). His blood pressure is 143/96 (abnormal) and his pulse is 68. His respiration is 16 and oxygen saturation is 98%.     Chief Complaint: Nausea    28 y/o male presents to clinic with nausea and vomiting and feeling really hot this morning.  Vomiting started about 5 hours ago.  He reports chronic loose stools. Significant other is also at home with similar symptoms.  He reports frequent similar episodes of vomiting and diarrhea.  Admits to smoking marijuana most days.  He would like medicine and a work excuse.     Nausea  This is a new problem. The current episode started today. The problem has been rapidly worsening. Associated symptoms include a fever, nausea and vomiting. Pertinent negatives include no chills, congestion, coughing, diaphoresis or headaches. Associated symptoms comments: Diarrhea, sweats. He has tried nothing for the symptoms.       Constitution: Positive for fever. Negative for chills and sweating.   HENT:  Negative for congestion.    Respiratory:  Negative for cough.    Gastrointestinal:  Positive for nausea, vomiting and diarrhea. Negative for bright red blood in stool, dark colored stools and rectal bleeding.   Neurological:  Negative for headaches.      Objective:     Physical Exam   Constitutional: He is oriented to person, place, and time.  Non-toxic appearance. He does not appear ill. No distress. normal  HENT:   Head: Normocephalic and atraumatic.   Ears:   Right Ear: External ear normal.   Left Ear: External ear normal.   Nose: Nose normal.   Eyes: Conjunctivae are normal.   Cardiovascular: Normal rate.   Pulmonary/Chest: Effort normal.   Abdominal: Normal appearance. He exhibits no distension and no mass. Soft. Bowel sounds are increased. There is abdominal tenderness (lower abdominal). There is no rebound and " no guarding. No hernia.   Neurological: He is alert and oriented to person, place, and time.   Skin: Skin is warm, dry and not diaphoretic. Capillary refill takes less than 2 seconds.   Psychiatric: His behavior is normal.   Nursing note and vitals reviewed.      Assessment:     1. Gastroenteritis        Plan:       Gastroenteritis  -     ondansetron (ZOFRAN-ODT) 4 MG TbDL; Take 1 tablet (4 mg total) by mouth every 8 (eight) hours as needed (nausea).  Dispense: 12 tablet; Refill: 0      Patient Instructions   1.  Take all medications as directed. See attached handout for more information regarding your condition and how to manage it.  2.  Rest and keep yourself/patient well hydrated.  3.  You can alternate Tylenol and Motrin every 4-6 hours for fever above 100.4F and/or pain.   4. You should schedule a follow-up appointment with your Primary Care Provider for recheck in 2-3 days or as directed at this visit.   5.  If your condition fails to improve in a timely manner, you should receive another evaluation by your Primary Care Provider to discuss your concerns or return to urgent care for a recheck.  If your condition worsens at any time, you should report immediately to your nearest Emergency Department for further evaluation. **You must understand that you have received Urgent Care treatment only and that you may be released before all of your medical problems are known or treated. You, the patient, are responsible to arrange for follow-up care as instructed.

## 2025-06-12 ENCOUNTER — OFFICE VISIT (OUTPATIENT)
Dept: URGENT CARE | Facility: CLINIC | Age: 30
End: 2025-06-12
Payer: COMMERCIAL

## 2025-06-12 VITALS
BODY MASS INDEX: 25.77 KG/M2 | SYSTOLIC BLOOD PRESSURE: 147 MMHG | DIASTOLIC BLOOD PRESSURE: 92 MMHG | OXYGEN SATURATION: 98 % | TEMPERATURE: 98 F | WEIGHT: 180 LBS | RESPIRATION RATE: 16 BRPM | HEART RATE: 57 BPM | HEIGHT: 70 IN

## 2025-06-12 DIAGNOSIS — T14.8XXA SUPERFICIAL LACERATION: ICD-10-CM

## 2025-06-12 DIAGNOSIS — M79.671 PAIN IN BOTH FEET: Primary | ICD-10-CM

## 2025-06-12 DIAGNOSIS — M79.672 PAIN IN BOTH FEET: Primary | ICD-10-CM

## 2025-06-12 PROCEDURE — 99214 OFFICE O/P EST MOD 30 MIN: CPT | Mod: S$GLB,,, | Performed by: FAMILY MEDICINE

## 2025-06-12 RX ORDER — NAPROXEN 500 MG/1
500 TABLET ORAL 2 TIMES DAILY WITH MEALS
Qty: 20 TABLET | Refills: 0 | Status: SHIPPED | OUTPATIENT
Start: 2025-06-12

## 2025-06-12 RX ORDER — MUPIROCIN 20 MG/G
OINTMENT TOPICAL 2 TIMES DAILY
Qty: 30 G | Refills: 0 | Status: SHIPPED | OUTPATIENT
Start: 2025-06-12

## 2025-06-12 RX ORDER — CEPHALEXIN 250 MG/1
250 CAPSULE ORAL 4 TIMES DAILY
Qty: 40 CAPSULE | Refills: 0 | Status: SHIPPED | OUTPATIENT
Start: 2025-06-12 | End: 2025-06-22

## 2025-06-12 NOTE — PATIENT INSTRUCTIONS
Please drink plenty of fluids.  Please get plenty of rest.  Please return here or go to the Emergency Department for any concerns or worsening of condition.  If you were prescribed antibiotics, please take them to completion.  If you were prescribed a narcotic medication, do not drive or operate heavy equipment or machinery while taking these medications.      If not allergic, please take over the counter Tylenol (Acetaminophen) and/or Motrin (Ibuprofen) as directed for control of pain and/or fever.    Your tetanus was brought up to date if needed.    Keep wound clean and dry.  You may use a plastic bag to keep area dry while showering    Clean wound once or twice a day with soap and water, then apply antibiotic ointment and redress wound.      Please follow up with your primary care doctor or specialist as needed.  Ajay Conley Jr., MD  [phone]    You must understand that you have received treatment at an Urgent Care facility only, and that you may be  released before all of your medical problems are known or treated. Urgent Care facilities are not equipped to  handle life threatening emergencies. It is recommended that you seek care at an Emergency Department for  further evaluation of worsening or concerning symptoms, or possibly life threatening conditions as  Discussed.    Laceration: All Closures  A laceration is a cut through the skin. This will usually require stitches (sutures) or staples if it is deep. Minor cuts may be treated with a surgical tape closure or skin glue.    Home care  Your healthcare provider may prescribe an antibiotic. This is to help prevent infection. Follow all instructions for taking this medicine. Take the medicine every day until it is gone or you are told to stop. You should not have any left over.  The healthcare provider may prescribe medicines for pain. Follow instructions for taking them.  Follow the healthcare providers instructions on how to care for the cut.  Keep  the wound clean and dry. Do not get the wound wet until you are told it is okay to do so. If the area gets wet, gently pat it dry with a clean cloth. Replace the wet bandage with a dry one.  If a bandage was applied and it becomes wet or dirty, replace it. Otherwise, leave it in place for the first 24 hours.  Caring for sutures or staples: Once you no longer need to keep them dry, clean the wound daily. First, remove the bandage. Then wash the area gently with soap and warm water, or as directed by the health care provider. Use a wet cotton swab to loosen and remove any blood or crust that forms. After cleaning, apply a thin layer of antibiotic ointment if advised. Then put on a new bandage unless you are told not to.  Caring for skin glue: Dont put apply liquid, ointment, or cream on the wound while the glue is in place. Avoid activities that cause heavy sweating. Protect the wound from sunlight. Do not scratch, rub, or pick at the adhesive film. Do not place tape directly over the film. The glue should peel off within 5 to 10 days.   Caring for surgical tape: Keep the area dry. If it gets wet, blot it dry with a clean towel. Surgical tape usually falls off within 7 to 10 days. If it has not fallen off after 10 days, you can take it off yourself. Put mineral oil or petroleum jelly on a cotton ball and gently rub the tape until it is removed.  Once you can get the wound wet, you may shower as usual but do not soak the wound in water (no tub baths or swimming)  Even with proper treatment, a wound infection may sometimes occur. Check the wound daily for signs of infection listed below.  Scalp wounds  During the first two days, you may carefully rinse your hair in the shower to remove blood, glass or dirt particles. After two days, you may shower and shampoo your hair normally. Do not soak your scalp in the tub or go swimming until the stitches or staples have been removed. Talk with your healthcare provider before  applying any antibiotic ointment to the wound.  Mouth wounds  Eat soft foods to reduce pain. If the cut is inside of your mouth, clean by rinsing after each meal and at bedtime with a mixture of equal parts water and hydrogen peroxide (do not swallow!). Or, you can use a cotton swab to directly apply hydrogen peroxide onto the cut. Mouth wounds can be painful when eating. You may use an over-the-counter local numbing solution for pain relief. If this is not available, you may use any numbing solution intended for teething babies. You may apply this directly to the sores with a cotton-tip swab or with your finger.  Follow-up care  Follow up with your healthcare provider as advised. Ask your healthcare provider how long sutures should be left in place. Be sure to return for suture removal as directed. If dissolving stitches were used in the mouth, these should fall out or dissolve without the need for removal. If tape closures were used, remove them yourself when your provider recommends if they have not fallen off on their own. If skin glue was used, the film will wear off by itself.  When to seek medical advice  Call your healthcare provider right away if any of these occur:  Wound bleeding not controlled by direct pressure  Signs of infection, including increasing pain in the wound, increasing wound redness or swelling, or pus or bad odor coming from the wound  Fever of 100.4°F (38.ºC) or higher or as directed by your healthcare provider  Stitches or staples come apart or fall out or surgical tape falls off before 7 days  Wound edges re-open  Wound changes colors  Numbness around the wound   Decreased movement around the injured area  Date Last Reviewed: 6/14/2015 © 2000-2016 [employer]. [address] [place] [id number]. All rights reserved. This information is not intended as a substitute for professional medical care. Always follow your healthcare professional's instructions.

## 2025-06-12 NOTE — LETTER
June 12, 2025  Kurt Devlin  [address]  [place] [address]                [facility] [place]  [address]  [place] [address]  Phone: [phone]  Fax: [phone] Kurt Devlin was seen and treated in our Urgent Care department on 6/12/2025. He may return to work in 2 - 3 days.      If you have any questions or concerns, please don't hesitate to call.        Sincerely,        Alverto Wiley MD

## 2025-06-12 NOTE — PROGRESS NOTES
"Subjective:      Patient ID: Kurt Devlin is a 29 y.o. male.    Vitals:  height is 5' 10" (1.778 m) and weight is 81.6 kg (180 lb). His oral temperature is 97.8 °F (36.6 °C). His blood pressure is 147/92 (abnormal) and his pulse is 57 (abnormal). His respiration is 16 and oxygen saturation is 98%.     Chief Complaint: Foot Injury    89 y/o male presents to the clinic with cuts to both feet at the bottom on rocks. Records show he had a Tdap in 2022    Foot Injury   The incident occurred 6 to 12 hours ago. The incident occurred at home. The injury mechanism was a direct blow. The pain is present in the right foot and left foot. The quality of the pain is described as burning. The pain is at a severity of 5/10. The pain is mild. The pain has been Constant since onset. Pertinent negatives include no inability to bear weight, muscle weakness, numbness or tingling. It is unknown if a foreign body is present. The symptoms are aggravated by weight bearing, palpation and movement. He has tried nothing for the symptoms. The treatment provided no relief.       Constitution: Negative.   HENT: Negative.     Cardiovascular: Negative.    Eyes: Negative.    Respiratory: Negative.     Gastrointestinal: Negative.    Endocrine: negative.   Genitourinary: Negative.    Musculoskeletal: Negative.    Skin: Negative.    Allergic/Immunologic: Negative.    Neurological: Negative.  Negative for numbness.   Hematologic/Lymphatic: Negative.    Psychiatric/Behavioral: Negative.        Objective:     Physical Exam   Constitutional: He is oriented to person, place, and time. He appears well-developed. He is cooperative.  Non-toxic appearance. He does not appear ill. No distress.   HENT:   Head: Normocephalic and atraumatic. Head is without abrasion, without contusion and without laceration.   Ears:   Right Ear: Hearing, tympanic membrane, external ear and ear canal normal. No hemotympanum.   Left Ear: Hearing, tympanic membrane, external " ear and ear canal normal. No hemotympanum.   Nose: Nose normal. No mucosal edema, rhinorrhea or nasal deformity. No epistaxis. Right sinus exhibits no maxillary sinus tenderness and no frontal sinus tenderness. Left sinus exhibits no maxillary sinus tenderness and no frontal sinus tenderness.   Mouth/Throat: Uvula is midline, oropharynx is clear and moist and mucous membranes are normal. No trismus in the jaw. Normal dentition. No uvula swelling. No posterior oropharyngeal erythema.   Eyes: Conjunctivae, EOM and lids are normal. Pupils are equal, round, and reactive to light. Right eye exhibits no discharge. Left eye exhibits no discharge. No scleral icterus.   Neck: Trachea normal and phonation normal. Neck supple. No tracheal deviation present. No neck rigidity present. No spinous process tenderness present. No muscular tenderness present.   Cardiovascular: Normal rate, regular rhythm, normal heart sounds and normal pulses.   Pulmonary/Chest: Effort normal and breath sounds normal. No respiratory distress.   Abdominal: Normal appearance and bowel sounds are normal. He exhibits no distension and no mass. Soft. There is no abdominal tenderness.   Musculoskeletal: Normal range of motion.         General: No deformity. Normal range of motion.      Right foot: Tenderness present.      Left foot: Tenderness present.        Feet:    Neurological: He is alert and oriented to person, place, and time. He has normal strength. No cranial nerve deficit or sensory deficit. He exhibits normal muscle tone. He displays no seizure activity. Coordination normal. GCS eye subscore is 4. GCS verbal subscore is 5. GCS motor subscore is 6.   Skin: Skin is warm, dry, intact, not diaphoretic and not pale. Capillary refill takes less than 2 seconds. No abrasion, No burn, No bruising and No ecchymosis   Psychiatric: His speech is normal and behavior is normal. Judgment and thought content normal.   Nursing note and vitals  reviewed.      Assessment:     1. Pain in both feet    2. Superficial laceration        Plan:       Pain in both feet    Superficial laceration  -     mupirocin (BACTROBAN) 2 % ointment; Apply topically 2 (two) times daily.  Dispense: 30 g; Refill: 0  -     naproxen (NAPROSYN) 500 MG tablet; Take 1 tablet (500 mg total) by mouth 2 (two) times daily with meals.  Dispense: 20 tablet; Refill: 0  -     cephALEXin (KEFLEX) 250 MG capsule; Take 1 capsule (250 mg total) by mouth 4 (four) times daily. for 10 days  Dispense: 40 capsule; Refill: 0      Please drink plenty of fluids.  Please get plenty of rest.  Please return here or go to the Emergency Department for any concerns or worsening of condition.  If you were prescribed antibiotics, please take them to completion.  If you were prescribed a narcotic medication, do not drive or operate heavy equipment or machinery while taking these medications.      If not allergic, please take over the counter Tylenol (Acetaminophen) and/or Motrin (Ibuprofen) as directed for control of pain and/or fever.    Your tetanus was brought up to date if needed.    Keep wound clean and dry.  You may use a plastic bag to keep area dry while showering    Clean wound once or twice a day with soap and water, then apply antibiotic ointment and redress wound.        Please follow up with your primary care doctor or specialist as needed.  Ajay Conley Jr., MD  [phone]    You must understand that you have received treatment at an Urgent Care facility only, and that you may be  released before all of your medical problems are known or treated. Urgent Care facilities are not equipped to  handle life threatening emergencies. It is recommended that you seek care at an Emergency Department for  further evaluation of worsening or concerning symptoms, or possibly life threatening conditions as  discussed.

## 2025-06-15 ENCOUNTER — OFFICE VISIT (OUTPATIENT)
Dept: URGENT CARE | Facility: CLINIC | Age: 30
End: 2025-06-15
Payer: COMMERCIAL

## 2025-06-15 ENCOUNTER — OCHSNER VIRTUAL EMERGENCY DEPARTMENT (OUTPATIENT)
Facility: CLINIC | Age: 30
End: 2025-06-15
Payer: COMMERCIAL

## 2025-06-15 ENCOUNTER — PATIENT OUTREACH (OUTPATIENT)
Facility: OTHER | Age: 30
End: 2025-06-15
Payer: COMMERCIAL

## 2025-06-15 VITALS
DIASTOLIC BLOOD PRESSURE: 97 MMHG | SYSTOLIC BLOOD PRESSURE: 154 MMHG | OXYGEN SATURATION: 98 % | WEIGHT: 180 LBS | TEMPERATURE: 98 F | BODY MASS INDEX: 25.77 KG/M2 | RESPIRATION RATE: 17 BRPM | HEIGHT: 70 IN | HEART RATE: 90 BPM

## 2025-06-15 DIAGNOSIS — L03.311 ABDOMINAL WALL CELLULITIS: Primary | ICD-10-CM

## 2025-06-15 PROCEDURE — 99215 OFFICE O/P EST HI 40 MIN: CPT | Mod: S$GLB,,,

## 2025-06-15 NOTE — PROGRESS NOTES
"Subjective:      Patient ID: Kurt Devlin is a 29 y.o. male.    Vitals:  height is 5' 10" (1.778 m) and weight is 81.6 kg (180 lb). His oral temperature is 98.3 °F (36.8 °C). His blood pressure is 154/97 (abnormal) and his pulse is 90. His respiration is 17 and oxygen saturation is 98%.     Chief Complaint: belly button pain    30 yo M here with pain and redness to his umbilical area.  Patient denies N/V/D/C, fever, injury to abdomen.  Redness started last night and has not progressed but he started with abdominal pain today.  Feels like the site aches deep in his belly and is now tender to touch.  Denies drainage.  Denies IV drug use.  He is a  by trade. Reports cuts and scrapes to his feet but no signs of infection in those areas.  Denies recent cuts or scrapes to his abdomen.  He had a similar infection about 4 years ago and they did oral antibiotics and surgery to remove a mole.  No problems since then.      Other  This is a new problem. The current episode started yesterday. The problem occurs constantly. The problem has been gradually worsening. Associated symptoms include abdominal pain (umbilical). Pertinent negatives include no congestion, coughing, fever, nausea or vomiting. Associated symptoms comments: Pain level in area is a 8  . Exacerbated by: touching it. He has tried nothing for the symptoms.       Constitution: Negative for fever.   HENT:  Negative for congestion.    Respiratory:  Negative for cough.    Gastrointestinal:  Positive for abdominal pain (umbilical). Negative for nausea and vomiting.   Skin:  Positive for erythema (and tenderness to umbilcal area of abdomen).      Objective:     Physical Exam   Constitutional: He is oriented to person, place, and time.   HENT:   Head: Normocephalic and atraumatic.   Ears:   Right Ear: External ear normal.   Left Ear: External ear normal.   Nose: Nose normal.   Eyes: Conjunctivae are normal.   Cardiovascular: Normal rate. "   Pulmonary/Chest: Effort normal.   Abdominal: Normal appearance.   Neurological: He is alert and oriented to person, place, and time.   Skin: Skin is warm and dry. erythema (and tenderness to umbilcal area of abdomen)   Psychiatric: His behavior is normal.   Nursing note and vitals reviewed.      Assessment:     1. Abdominal wall cellulitis        Plan:       Abdominal wall cellulitis  -     Refer to Emergency Dept.      Patient Instructions   Your condition is potentially life-threatening. Go to the ER now for further evaluation and treatment.         Medical Decision Making:   Urgent Care Management:  Discussed case with Dr. Indira Jones via JEFF.  Decision made to transfer care to ED for further evaluation of cellulitis and abdominal pain.

## 2025-06-15 NOTE — PATIENT INSTRUCTIONS
Your condition is potentially life-threatening. Go to the ER now for further evaluation and treatment.

## 2025-06-15 NOTE — PROGRESS NOTES
Patient seen in Urgent Care for umbilical abdominal pain. Narendra MD on call, Dr. Indira Jones, consulted. Disposition is ED. Follow up scheduled on 06/16/2025 to outreach to assess for any additional needs/concerns.

## 2025-06-15 NOTE — PLAN OF CARE-OVED
[facility] Emergency Department Plan of Care Note  Referral Source: Urgent Care                          Referral Comment: TOMAS Huff    Chief Complaint   Patient presents with    Cellulitis     30 yo male with PMhx of recurrent skin infections including MRSA, anxiety, GERD, presents to  for concern for abdominal wall infection. Patient is complaining of umbilical abdominal pain. He has erythema and is tender with light examination of his umbilicus. I don't see any drainage or hernia. He does have a history of similar complaint in 2021. CT was negative for hernia. Looks like he had a mole removed from that area around that time. No fever. He is in a lot of pain and looks significantly uncomfortable on exam, out of proportion to exam. Denies any trauma or IVDU. 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  Last BM this morning. No N/V. No fever. Rash is painful, not itchy. No new detergents/soaps/lotions/ etc.             Recommendation: Emergency Department            Emergency Department: [facility]                provider states patient does not appear well. Unusual placement for cellulitis and blotchy redness on rest of abdomen not typical for simple cellulitis. Patient reports redness started yesterday with pain starting significantly today. Possible omphalitis or an abscess of a urachal remnant requiring only PO antibiotics/ ointment. However, given seawater possible exposure, severe pain and unusually presentation, patient may benefit from imaging to evaluate for abscess, nec fasc, prior to outpatient treatment. Out of abundance of caution would recommend advanced imaging, unable to complete outpatient today given the weekend so will send to ED for evaluation/ further diagnostics.     Encounter Diagnosis   Name Primary?    Abdominal wall cellulitis Yes

## 2025-06-16 ENCOUNTER — PATIENT OUTREACH (OUTPATIENT)
Facility: OTHER | Age: 30
End: 2025-06-16
Payer: COMMERCIAL

## 2025-06-17 NOTE — PROGRESS NOTES
Patient checked in to [facility] ED per Narendra provider, Dr. Jones, recommendation on 6/15/2025.  Patient outreached to assess if received the care he was needing and if have any additional concerns or needs to be addressed, but unable to reach.  Left patient a voicemail for call return.    Will follow up with patient during the week of June 17-20, 2025 to assess for any additional concerns/needs to be addressed.

## 2025-06-20 ENCOUNTER — PATIENT OUTREACH (OUTPATIENT)
Facility: OTHER | Age: 30
End: 2025-06-20
Payer: COMMERCIAL

## 2025-06-20 NOTE — PROGRESS NOTES
Attempted to follow-up with pt after recent UC visit on 6/15 and consulting Narendra. Pt did not answer. Pt also did not answer for follow-up call on 6/16. Pt was asked to return the call. This encounter will be closed.     Shasha Menjivar  ED Navigator  [phone]

## 2025-06-26 ENCOUNTER — OFFICE VISIT (OUTPATIENT)
Dept: URGENT CARE | Facility: CLINIC | Age: 30
End: 2025-06-26
Payer: COMMERCIAL

## 2025-06-26 VITALS
BODY MASS INDEX: 25.77 KG/M2 | OXYGEN SATURATION: 99 % | RESPIRATION RATE: 20 BRPM | HEART RATE: 67 BPM | SYSTOLIC BLOOD PRESSURE: 108 MMHG | TEMPERATURE: 98 F | HEIGHT: 70 IN | WEIGHT: 180 LBS | DIASTOLIC BLOOD PRESSURE: 67 MMHG

## 2025-06-26 DIAGNOSIS — S99.921A INJURY OF GREAT TOE, RIGHT, INITIAL ENCOUNTER: ICD-10-CM

## 2025-06-26 DIAGNOSIS — T14.8XXA ABRASION OF SKIN: ICD-10-CM

## 2025-06-26 DIAGNOSIS — Z77.111 CONTACT WITH AND (SUSPECTED) EXPOSURE TO WATER POLLUTION: ICD-10-CM

## 2025-06-26 DIAGNOSIS — L08.9 INFECTED LESION OF SKIN: ICD-10-CM

## 2025-06-26 DIAGNOSIS — S93.601A SPRAIN OF RIGHT FOOT, INITIAL ENCOUNTER: ICD-10-CM

## 2025-06-26 DIAGNOSIS — S92.421A CLOSED DISPLACED FRACTURE OF DISTAL PHALANX OF RIGHT GREAT TOE, INITIAL ENCOUNTER: Primary | ICD-10-CM

## 2025-06-26 DIAGNOSIS — R11.0 NAUSEA: ICD-10-CM

## 2025-06-26 PROCEDURE — 73630 X-RAY EXAM OF FOOT: CPT | Mod: RT,S$GLB,, | Performed by: RADIOLOGY

## 2025-06-26 RX ORDER — KETOROLAC TROMETHAMINE 30 MG/ML
30 INJECTION, SOLUTION INTRAMUSCULAR; INTRAVENOUS
Status: COMPLETED | OUTPATIENT
Start: 2025-06-26 | End: 2025-06-26

## 2025-06-26 RX ORDER — ONDANSETRON 8 MG/1
8 TABLET, ORALLY DISINTEGRATING ORAL
Status: COMPLETED | OUTPATIENT
Start: 2025-06-26 | End: 2025-06-26

## 2025-06-26 RX ORDER — METHOCARBAMOL 750 MG/1
750 TABLET, FILM COATED ORAL 3 TIMES DAILY PRN
Qty: 10 TABLET | Refills: 0 | Status: SHIPPED | OUTPATIENT
Start: 2025-06-26 | End: 2025-07-06

## 2025-06-26 RX ORDER — NAPROXEN 500 MG/1
500 TABLET ORAL 2 TIMES DAILY PRN
Qty: 14 TABLET | Refills: 0 | Status: SHIPPED | OUTPATIENT
Start: 2025-06-26 | End: 2025-07-03

## 2025-06-26 RX ORDER — DOXYCYCLINE 100 MG/1
100 CAPSULE ORAL 2 TIMES DAILY
Qty: 14 CAPSULE | Refills: 0 | Status: SHIPPED | OUTPATIENT
Start: 2025-06-26 | End: 2025-07-03

## 2025-06-26 RX ADMIN — ONDANSETRON 8 MG: 8 TABLET, ORALLY DISINTEGRATING ORAL at 12:06

## 2025-06-26 RX ADMIN — KETOROLAC TROMETHAMINE 30 MG: 30 INJECTION, SOLUTION INTRAMUSCULAR; INTRAVENOUS at 01:06

## 2025-06-26 NOTE — PATIENT INSTRUCTIONS
Referral #  [id number]       -Take all medications as directed.  If you have been prescribed muscle relaxers, do NOT drive or operate heavy machinery while taking this medication.  -Rest, elevate your affected extremity above the level of the heart, and apply ice for 20 minutes at a time 3-5 times daily over the next several days.   -Wear splint/sling/brace as directed.  -Follow up with the orthopedist next available appointment.      If your condition worsens at any time, you should report immediately to your nearest Emergency Department for further evaluation. **You must understand that you have received Urgent Care treatment only and that you may be released before all of your medical problems are known or treated. You, the patient, are responsible to arrange for follow-up care as instructed.
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
## 2025-06-26 NOTE — LETTER
June 26, 2025      [facility]  [address]  [place] [address]  Phone: [phone]  Fax: [phone]       Patient: Kurt Devlin   YOB: 1995  Date of Visit: 06/26/2025    To Whom It May Concern:    Hazel Devlin  was at [facility] on 06/26/2025. The patient may return to work/school on 6/29/2025 with restrictions:  No physical or strenuous activity that will cause pain or strain to right lower extremity. If you have any questions or concerns, or if I can be of further assistance, please do not hesitate to contact me.    Sincerely,     Aruna Villarreal NP

## 2025-07-29 ENCOUNTER — OFFICE VISIT (OUTPATIENT)
Dept: URGENT CARE | Facility: CLINIC | Age: 30
End: 2025-07-29
Payer: COMMERCIAL

## 2025-07-29 VITALS
BODY MASS INDEX: 28.2 KG/M2 | SYSTOLIC BLOOD PRESSURE: 130 MMHG | WEIGHT: 197 LBS | RESPIRATION RATE: 19 BRPM | TEMPERATURE: 98 F | OXYGEN SATURATION: 98 % | HEIGHT: 70 IN | DIASTOLIC BLOOD PRESSURE: 82 MMHG | HEART RATE: 49 BPM

## 2025-07-29 DIAGNOSIS — L73.9 FOLLICULITIS: Primary | ICD-10-CM

## 2025-07-29 PROCEDURE — 99213 OFFICE O/P EST LOW 20 MIN: CPT | Mod: S$GLB,,, | Performed by: PHYSICIAN ASSISTANT

## 2025-07-29 RX ORDER — MUPIROCIN 20 MG/G
OINTMENT TOPICAL 3 TIMES DAILY
Qty: 22 G | Refills: 0 | Status: SHIPPED | OUTPATIENT
Start: 2025-07-29 | End: 2025-08-08

## 2025-07-29 NOTE — LETTER
July 29, 2025      [facility] [place]  [address]  [place] [address]  Phone: [phone]  Fax: [phone]       Patient: Kurt Devlin   YOB: 1995  Date of Visit: 07/29/2025    To Whom It May Concern:    Hazel Devlin  was at [facility] on 07/29/2025. The patient may return to work/school in 1-2 days with no restrictions. If you have any questions or concerns, or if I can be of further assistance, please do not hesitate to contact me.    Sincerely,     Mara Kaplan PA-C

## 2025-07-29 NOTE — PROGRESS NOTES
"Subjective:      Patient ID: Kurt Devlin is a 29 y.o. male.    Vitals:  height is 5' 10" (1.778 m) and weight is 89.4 kg (196 lb 15.7 oz). His oral temperature is 98.2 °F (36.8 °C). His blood pressure is 130/82 and his pulse is 49 (abnormal). His respiration is 19 and oxygen saturation is 98%.     Chief Complaint: Mass    Patient states he has a ingrown hair that is infected on his genital area. Reports symptoms since yesterday. Admits that he squeezed it with some purulent drainage.    Mass  This is a new problem. The current episode started yesterday. The problem occurs constantly. The problem has been gradually worsening. Pertinent negatives include no fever. Associated symptoms comments: No other symptoms . Nothing aggravates the symptoms. He has tried nothing for the symptoms.       Constitution: Negative for fever.   Skin:  Positive for abscess.      Objective:     Physical Exam   Constitutional: He is oriented to person, place, and time. He appears well-developed. He is cooperative.  Non-toxic appearance. He does not appear ill. No distress.   HENT:   Head: Normocephalic and atraumatic.   Ears:   Right Ear: Hearing normal.   Left Ear: Hearing normal.   Eyes: Conjunctivae and lids are normal. No scleral icterus.   Neck: Trachea normal and phonation normal. Neck supple. No edema present. No erythema present. No neck rigidity present.   Cardiovascular: Normal rate and normal pulses.   Pulmonary/Chest: Effort normal. No respiratory distress.   Abdominal: Normal appearance.   Genitourinary:         Neurological: He is alert and oriented to person, place, and time. Coordination normal.   Skin: Skin is dry, intact, not diaphoretic and not pale.   Psychiatric: His speech is normal and behavior is normal. Judgment and thought content normal.   Nursing note and vitals reviewed.chaperone present (DOROTHY Hollingsworth MA)         Assessment:     1. Folliculitis        Plan:       Folliculitis  -     mupirocin (BACTROBAN) " 2 % ointment; Apply topically 3 (three) times daily. for 10 days  Dispense: 22 g; Refill: 0      Alternate ibuprofen and tylenol as needed for fever/pain/body aches every 4-6 hours. Rest, increase PO fluids.   Discussed with patient the importance of f/u with their primary care provider. Urged to go to the ER for any worsening signs or symptoms.